# Patient Record
Sex: FEMALE | Race: WHITE | Employment: FULL TIME | URBAN - METROPOLITAN AREA
[De-identification: names, ages, dates, MRNs, and addresses within clinical notes are randomized per-mention and may not be internally consistent; named-entity substitution may affect disease eponyms.]

---

## 2018-04-09 ENCOUNTER — OFFICE VISIT (OUTPATIENT)
Dept: FAMILY MEDICINE CLINIC | Age: 48
End: 2018-04-09
Payer: COMMERCIAL

## 2018-04-09 VITALS
HEART RATE: 60 BPM | HEIGHT: 61 IN | WEIGHT: 162.4 LBS | SYSTOLIC BLOOD PRESSURE: 110 MMHG | DIASTOLIC BLOOD PRESSURE: 79 MMHG | BODY MASS INDEX: 30.66 KG/M2

## 2018-04-09 DIAGNOSIS — Z01.419 ENCOUNTER FOR CERVICAL PAP SMEAR WITH PELVIC EXAM: ICD-10-CM

## 2018-04-09 DIAGNOSIS — M54.6 THORACIC SPINE PAIN: ICD-10-CM
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
## 2018-04-09 DIAGNOSIS — G89.29 CHRONIC RIGHT-SIDED LOW BACK PAIN WITHOUT SCIATICA: Primary | ICD-10-CM

## 2018-04-09 DIAGNOSIS — T14.8XXA MUSCULOSKELETAL STRAIN: ICD-10-CM

## 2018-04-09 DIAGNOSIS — Z83.3 FAMILY HISTORY OF DIABETES MELLITUS: ICD-10-CM

## 2018-04-09 DIAGNOSIS — M54.50 CHRONIC RIGHT-SIDED LOW BACK PAIN WITHOUT SCIATICA: Primary | ICD-10-CM

## 2018-04-09 DIAGNOSIS — R10.9 RIGHT FLANK PAIN: ICD-10-CM

## 2018-04-09 DIAGNOSIS — N95.1 PERIMENOPAUSE: ICD-10-CM

## 2018-04-09 DIAGNOSIS — Z23 NEED FOR PROPHYLACTIC VACCINATION AGAINST DIPHTHERIA-TETANUS-PERTUSSIS (DTP): ICD-10-CM

## 2018-04-09 DIAGNOSIS — Z00.00 PREVENTATIVE HEALTH CARE: ICD-10-CM

## 2018-04-09 DIAGNOSIS — Z13.220 SCREENING FOR HYPERLIPIDEMIA: ICD-10-CM

## 2018-04-09 PROCEDURE — 90471 IMMUNIZATION ADMIN: CPT | Performed by: NURSE PRACTITIONER

## 2018-04-09 PROCEDURE — 99204 OFFICE O/P NEW MOD 45 MIN: CPT | Performed by: NURSE PRACTITIONER

## 2018-04-09 PROCEDURE — 90715 TDAP VACCINE 7 YRS/> IM: CPT | Performed by: NURSE PRACTITIONER

## 2018-04-09 RX ORDER — NAPROXEN 500 MG/1
500 TABLET ORAL 2 TIMES DAILY WITH MEALS
Qty: 60 TABLET | Refills: 1 | Status: SHIPPED | OUTPATIENT
Start: 2018-04-09 | End: 2021-08-18

## 2018-04-09 RX ORDER — TIZANIDINE 4 MG/1
4 TABLET ORAL 3 TIMES DAILY
Qty: 42 TABLET | Refills: 0 | Status: SHIPPED | OUTPATIENT
Start: 2018-04-09 | End: 2021-08-18

## 2018-04-09 ASSESSMENT — PATIENT HEALTH QUESTIONNAIRE - PHQ9
SUM OF ALL RESPONSES TO PHQ9 QUESTIONS 1 & 2: 0
1. LITTLE INTEREST OR PLEASURE IN DOING THINGS: 0
SUM OF ALL RESPONSES TO PHQ QUESTIONS 1-9: 0
2. FEELING DOWN, DEPRESSED OR HOPELESS: 0

## 2018-04-09 ASSESSMENT — ENCOUNTER SYMPTOMS
SHORTNESS OF BREATH: 0
ABDOMINAL PAIN: 0
EYE DISCHARGE: 0
BLOOD IN STOOL: 0
COUGH: 0

## 2020-03-18 ENCOUNTER — HOSPITAL ENCOUNTER (EMERGENCY)
Age: 50
Discharge: HOME | End: 2020-03-19
Attending: EMERGENCY MEDICINE
Payer: COMMERCIAL

## 2020-03-18 PROCEDURE — 93005 ELECTROCARDIOGRAM TRACING: CPT | Performed by: EMERGENCY MEDICINE

## 2020-03-18 PROCEDURE — 99285 EMERGENCY DEPT VISIT HI MDM: CPT

## 2020-03-19 ENCOUNTER — APPOINTMENT (OUTPATIENT)
Dept: GENERAL RADIOLOGY | Age: 50
End: 2020-03-19
Payer: COMMERCIAL

## 2020-03-19 VITALS
HEART RATE: 78 BPM | BODY MASS INDEX: 33.96 KG/M2 | TEMPERATURE: 97.8 F | SYSTOLIC BLOOD PRESSURE: 109 MMHG | DIASTOLIC BLOOD PRESSURE: 70 MMHG | OXYGEN SATURATION: 98 % | WEIGHT: 173 LBS | HEIGHT: 60 IN | RESPIRATION RATE: 17 BRPM

## 2020-03-19 LAB
ABSOLUTE EOS #: 0.1 K/UL (ref 0–0.4)
ABSOLUTE IMMATURE GRANULOCYTE: ABNORMAL K/UL (ref 0–0.3)
ABSOLUTE LYMPH #: 2 K/UL (ref 1–4.8)
ABSOLUTE MONO #: 0.5 K/UL (ref 0.1–1.3)
ANION GAP SERPL CALCULATED.3IONS-SCNC: 12 MMOL/L (ref 9–17)
BASOPHILS # BLD: 1 % (ref 0–2)
BASOPHILS ABSOLUTE: 0.1 K/UL (ref 0–0.2)
BNP INTERPRETATION: NORMAL
BUN BLDV-MCNC: 17 MG/DL (ref 6–20)
BUN/CREAT BLD: ABNORMAL (ref 9–20)
CALCIUM SERPL-MCNC: 9.3 MG/DL (ref 8.6–10.4)
CHLORIDE BLD-SCNC: 104 MMOL/L (ref 98–107)
CO2: 25 MMOL/L (ref 20–31)
CREAT SERPL-MCNC: 0.82 MG/DL (ref 0.5–0.9)
D-DIMER QUANTITATIVE: 0.51 MG/L FEU (ref 0–0.59)
DIFFERENTIAL TYPE: ABNORMAL
EKG ATRIAL RATE: 71 BPM
EKG P AXIS: 30 DEGREES
EKG P-R INTERVAL: 202 MS
EKG Q-T INTERVAL: 378 MS
EKG QRS DURATION: 92 MS
EKG QTC CALCULATION (BAZETT): 410 MS
EKG R AXIS: 61 DEGREES
EKG T AXIS: 24 DEGREES
EKG VENTRICULAR RATE: 71 BPM
EOSINOPHILS RELATIVE PERCENT: 1 % (ref 0–4)
GFR AFRICAN AMERICAN: >60 ML/MIN
GFR NON-AFRICAN AMERICAN: >60 ML/MIN
GFR SERPL CREATININE-BSD FRML MDRD: ABNORMAL ML/MIN/{1.73_M2}
GFR SERPL CREATININE-BSD FRML MDRD: ABNORMAL ML/MIN/{1.73_M2}
GLUCOSE BLD-MCNC: 115 MG/DL (ref 70–99)
HCT VFR BLD CALC: 38 % (ref 36–46)
HEMOGLOBIN: 12.4 G/DL (ref 12–16)
IMMATURE GRANULOCYTES: ABNORMAL %
LYMPHOCYTES # BLD: 39 % (ref 24–44)
MAGNESIUM: 2 MG/DL (ref 1.6–2.6)
MCH RBC QN AUTO: 27.8 PG (ref 26–34)
MCHC RBC AUTO-ENTMCNC: 32.6 G/DL (ref 31–37)
MCV RBC AUTO: 85.4 FL (ref 80–100)
MONOCYTES # BLD: 9 % (ref 1–7)
NRBC AUTOMATED: ABNORMAL PER 100 WBC
PDW BLD-RTO: 12.7 % (ref 11.5–14.9)
PLATELET # BLD: 262 K/UL (ref 150–450)
PLATELET ESTIMATE: ABNORMAL
PMV BLD AUTO: 7.4 FL (ref 6–12)
POTASSIUM SERPL-SCNC: 3.7 MMOL/L (ref 3.7–5.3)
PRO-BNP: 28 PG/ML
RBC # BLD: 4.45 M/UL (ref 4–5.2)
RBC # BLD: ABNORMAL 10*6/UL
SEG NEUTROPHILS: 50 % (ref 36–66)
SEGMENTED NEUTROPHILS ABSOLUTE COUNT: 2.5 K/UL (ref 1.3–9.1)
SODIUM BLD-SCNC: 141 MMOL/L (ref 135–144)
TROPONIN INTERP: NORMAL
TROPONIN INTERP: NORMAL
TROPONIN T: NORMAL NG/ML
TROPONIN T: NORMAL NG/ML
TROPONIN, HIGH SENSITIVITY: <6 NG/L (ref 0–14)
TROPONIN, HIGH SENSITIVITY: <6 NG/L (ref 0–14)
TSH SERPL DL<=0.05 MIU/L-ACNC: 1.81 MIU/L (ref 0.3–5)
WBC # BLD: 5.1 K/UL (ref 3.5–11)
WBC # BLD: ABNORMAL 10*3/UL

## 2020-03-19 PROCEDURE — 2580000003 HC RX 258: Performed by: EMERGENCY MEDICINE

## 2020-03-19 PROCEDURE — 83735 ASSAY OF MAGNESIUM: CPT

## 2020-03-19 PROCEDURE — 96374 THER/PROPH/DIAG INJ IV PUSH: CPT

## 2020-03-19 PROCEDURE — 85379 FIBRIN DEGRADATION QUANT: CPT

## 2020-03-19 PROCEDURE — 85025 COMPLETE CBC W/AUTO DIFF WBC: CPT

## 2020-03-19 PROCEDURE — 80048 BASIC METABOLIC PNL TOTAL CA: CPT

## 2020-03-19 PROCEDURE — 84443 ASSAY THYROID STIM HORMONE: CPT

## 2020-03-19 PROCEDURE — 83880 ASSAY OF NATRIURETIC PEPTIDE: CPT

## 2020-03-19 PROCEDURE — 84484 ASSAY OF TROPONIN QUANT: CPT

## 2020-03-19 PROCEDURE — 6370000000 HC RX 637 (ALT 250 FOR IP): Performed by: EMERGENCY MEDICINE

## 2020-03-19 PROCEDURE — 71046 X-RAY EXAM CHEST 2 VIEWS: CPT

## 2020-03-19 PROCEDURE — 36415 COLL VENOUS BLD VENIPUNCTURE: CPT

## 2020-03-19 PROCEDURE — 2500000003 HC RX 250 WO HCPCS: Performed by: EMERGENCY MEDICINE

## 2020-03-19 RX ORDER — LORAZEPAM 1 MG/1
0.5 TABLET ORAL ONCE
Status: COMPLETED | OUTPATIENT
Start: 2020-03-19 | End: 2020-03-19

## 2020-03-19 RX ORDER — 0.9 % SODIUM CHLORIDE 0.9 %
1000 INTRAVENOUS SOLUTION INTRAVENOUS ONCE
Status: COMPLETED | OUTPATIENT
Start: 2020-03-19 | End: 2020-03-19

## 2020-03-19 RX ORDER — LIDOCAINE HYDROCHLORIDE 20 MG/ML
15 SOLUTION OROPHARYNGEAL ONCE
Status: COMPLETED | OUTPATIENT
Start: 2020-03-19 | End: 2020-03-19

## 2020-03-19 RX ORDER — MAGNESIUM HYDROXIDE/ALUMINUM HYDROXICE/SIMETHICONE 120; 1200; 1200 MG/30ML; MG/30ML; MG/30ML
30 SUSPENSION ORAL ONCE
Status: COMPLETED | OUTPATIENT
Start: 2020-03-19 | End: 2020-03-19

## 2020-03-19 RX ADMIN — LORAZEPAM 0.5 MG: 1 TABLET ORAL at 01:18

## 2020-03-19 RX ADMIN — LIDOCAINE HYDROCHLORIDE 15 ML: 20 SOLUTION ORAL; TOPICAL at 01:18

## 2020-03-19 RX ADMIN — FAMOTIDINE 20 MG: 10 INJECTION INTRAVENOUS at 01:19

## 2020-03-19 RX ADMIN — ALUMINUM HYDROXIDE, MAGNESIUM HYDROXIDE, AND SIMETHICONE 30 ML: 200; 200; 20 SUSPENSION ORAL at 01:19

## 2020-03-19 RX ADMIN — SODIUM CHLORIDE 1000 ML: 9 INJECTION, SOLUTION INTRAVENOUS at 01:29

## 2020-03-19 ASSESSMENT — ENCOUNTER SYMPTOMS
BACK PAIN: 0
VOMITING: 0
SHORTNESS OF BREATH: 1
DIARRHEA: 0
ABDOMINAL PAIN: 0
COUGH: 1

## 2020-03-19 NOTE — ED PROVIDER NOTES
and formal ultrasound images (except ED bedside ultrasound) are read by the radiologist, see reports below, unless otherwisenoted in MDM or here. XR CHEST STANDARD (2 VW)   Final Result   No evidence of acute cardiopulmonary disease           LABS: All lab results were reviewed by myself, and all abnormals are listed below. Labs Reviewed   CBC WITH AUTO DIFFERENTIAL - Abnormal; Notable for the following components:       Result Value    Monocytes 9 (*)     All other components within normal limits   BASIC METABOLIC PANEL - Abnormal; Notable for the following components:    Glucose 115 (*)     All other components within normal limits   D-DIMER, QUANTITATIVE   MAGNESIUM   BRAIN NATRIURETIC PEPTIDE   TROPONIN   TROPONIN   TSH WITH REFLEX       EMERGENCY DEPARTMENTCOURSE:   Differential diagnosis includes ACS pneumonia pneumothorax pulmonary embolism stress reaction. 12:53 AM EDT  Patient's dimer is 0.51 based on her age I do not believe that she has a DVT or PE at this time. There is no elevation in creatinine no electrolyte abnormality BNP is not elevated troponin is negative there is no leukocytosis no anemia TSH is normal.    2:20 AM EDT  Repeat troponin is negative. Chest x-ray is negative for pneumonia or pneumothorax unknown the etiology for the patient's symptoms however she has a low heart score do not believe that she requires inpatient hospitalization for cardiac risk ratification believe that this can be done as an outpatient. Patient feels much better she is completely asymptomatic at this time. She will be discharged. EKG: All EKG's are interpreted by the Emergency Department Physician who either signs or Co-signs this chart in the absence of a cardiologist.  Normal sinus rhythm with a heart rate of 71 beats per minutes prolonged UT interval of 202 ms T wave inversion in lead III no acute ST or T wave changes normal axis no previous to compare    Vitals:    Vitals:    03/18/20 2353 03/18/20

## 2020-03-19 NOTE — ED NOTES
Pt now complains of indigestion at this time. Dr. Bina Kennedy notified.      Cindy Ricci RN  03/19/20 3970

## 2021-08-17 ENCOUNTER — APPOINTMENT (OUTPATIENT)
Dept: CT IMAGING | Age: 51
DRG: 310 | End: 2021-08-17

## 2021-08-17 ENCOUNTER — APPOINTMENT (OUTPATIENT)
Dept: GENERAL RADIOLOGY | Age: 51
DRG: 310 | End: 2021-08-17

## 2021-08-17 ENCOUNTER — HOSPITAL ENCOUNTER (INPATIENT)
Age: 51
LOS: 1 days | Discharge: HOME | DRG: 310 | End: 2021-08-19
Attending: EMERGENCY MEDICINE | Admitting: INTERNAL MEDICINE

## 2021-08-17 DIAGNOSIS — I48.91 ATRIAL FIBRILLATION, UNSPECIFIED TYPE (HCC): Primary | ICD-10-CM

## 2021-08-17 DIAGNOSIS — R42 DIZZINESS: ICD-10-CM

## 2021-08-17 LAB
BILIRUBIN URINE: NEGATIVE
COLOR: YELLOW
COMMENT UA: NORMAL
D-DIMER QUANTITATIVE: 0.56 MG/L FEU (ref 0–0.59)
GLUCOSE URINE: NEGATIVE
KETONES, URINE: NEGATIVE
LACTIC ACID, SEPSIS WHOLE BLOOD: NORMAL MMOL/L (ref 0.5–1.9)
LACTIC ACID, SEPSIS: 0.7 MMOL/L (ref 0.5–1.9)
LEUKOCYTE ESTERASE, URINE: NEGATIVE
NITRITE, URINE: NEGATIVE
PH UA: 7 (ref 5–8)
PROTEIN UA: NEGATIVE
SPECIFIC GRAVITY UA: 1 (ref 1–1.03)
TURBIDITY: CLEAR
URINE HGB: NEGATIVE
UROBILINOGEN, URINE: NORMAL

## 2021-08-17 PROCEDURE — 6360000004 HC RX CONTRAST MEDICATION: Performed by: EMERGENCY MEDICINE

## 2021-08-17 PROCEDURE — 85025 COMPLETE CBC W/AUTO DIFF WBC: CPT

## 2021-08-17 PROCEDURE — 93005 ELECTROCARDIOGRAM TRACING: CPT | Performed by: EMERGENCY MEDICINE

## 2021-08-17 PROCEDURE — 36415 COLL VENOUS BLD VENIPUNCTURE: CPT

## 2021-08-17 PROCEDURE — 84484 ASSAY OF TROPONIN QUANT: CPT

## 2021-08-17 PROCEDURE — 85610 PROTHROMBIN TIME: CPT

## 2021-08-17 PROCEDURE — 81003 URINALYSIS AUTO W/O SCOPE: CPT

## 2021-08-17 PROCEDURE — 82550 ASSAY OF CK (CPK): CPT

## 2021-08-17 PROCEDURE — 2580000003 HC RX 258: Performed by: EMERGENCY MEDICINE

## 2021-08-17 PROCEDURE — 82553 CREATINE MB FRACTION: CPT

## 2021-08-17 PROCEDURE — 80048 BASIC METABOLIC PNL TOTAL CA: CPT

## 2021-08-17 PROCEDURE — 85379 FIBRIN DEGRADATION QUANT: CPT

## 2021-08-17 PROCEDURE — 71260 CT THORAX DX C+: CPT

## 2021-08-17 PROCEDURE — 71045 X-RAY EXAM CHEST 1 VIEW: CPT

## 2021-08-17 PROCEDURE — 85730 THROMBOPLASTIN TIME PARTIAL: CPT

## 2021-08-17 PROCEDURE — 83874 ASSAY OF MYOGLOBIN: CPT

## 2021-08-17 PROCEDURE — 99285 EMERGENCY DEPT VISIT HI MDM: CPT

## 2021-08-17 PROCEDURE — 70450 CT HEAD/BRAIN W/O DYE: CPT

## 2021-08-17 PROCEDURE — 83605 ASSAY OF LACTIC ACID: CPT

## 2021-08-17 RX ORDER — 0.9 % SODIUM CHLORIDE 0.9 %
80 INTRAVENOUS SOLUTION INTRAVENOUS ONCE
Status: COMPLETED | OUTPATIENT
Start: 2021-08-17 | End: 2021-08-17

## 2021-08-17 RX ORDER — 0.9 % SODIUM CHLORIDE 0.9 %
1000 INTRAVENOUS SOLUTION INTRAVENOUS ONCE
Status: COMPLETED | OUTPATIENT
Start: 2021-08-17 | End: 2021-08-17

## 2021-08-17 RX ORDER — 0.9 % SODIUM CHLORIDE 0.9 %
1000 INTRAVENOUS SOLUTION INTRAVENOUS ONCE
Status: COMPLETED | OUTPATIENT
Start: 2021-08-17 | End: 2021-08-18

## 2021-08-17 RX ORDER — SODIUM CHLORIDE 0.9 % (FLUSH) 0.9 %
10 SYRINGE (ML) INJECTION PRN
Status: DISCONTINUED | OUTPATIENT
Start: 2021-08-17 | End: 2021-08-19 | Stop reason: HOSPADM

## 2021-08-17 RX ADMIN — SODIUM CHLORIDE, PRESERVATIVE FREE 10 ML: 5 INJECTION INTRAVENOUS at 21:40

## 2021-08-17 RX ADMIN — SODIUM CHLORIDE 1000 ML: 9 INJECTION, SOLUTION INTRAVENOUS at 20:36

## 2021-08-17 RX ADMIN — IOPAMIDOL 75 ML: 755 INJECTION, SOLUTION INTRAVENOUS at 21:40

## 2021-08-17 RX ADMIN — SODIUM CHLORIDE 80 ML: 9 INJECTION, SOLUTION INTRAVENOUS at 21:40

## 2021-08-17 RX ADMIN — SODIUM CHLORIDE 1000 ML: 9 INJECTION, SOLUTION INTRAVENOUS at 23:55

## 2021-08-17 NOTE — ED TRIAGE NOTES
Mode of arrival (squad #, walk in, police, etc) : [address]        Chief complaint(s): Dizziness, shortness of breath, numbness        Arrival Note (brief scenario, treatment PTA, etc). : Pt arrives to ED c/o shortness of breath and dizziness. Patient states that she works from home and she was sitting when she suddenly got dizzy. Patient reports getting sweaty and reports emesis. EKG completed in triage. C= \"Have you ever felt that you should Cut down on your drinking? \"  No  A= \"Have people Annoyed you by criticizing your drinking? \"  No  G= \"Have you ever felt bad or Guilty about your drinking? \"  No  E= \"Have you ever had a drink as an Eye-opener first thing in the morning to steady your nerves or to help a hangover? \"  No      Deferred []      Reason for deferring: N/A    *If yes to two or more: probable alcohol abuse. *

## 2021-08-18 ENCOUNTER — APPOINTMENT (OUTPATIENT)
Dept: NUCLEAR MEDICINE | Age: 51
DRG: 310 | End: 2021-08-18

## 2021-08-18 ENCOUNTER — APPOINTMENT (OUTPATIENT)
Dept: NON INVASIVE DIAGNOSTICS | Age: 51
DRG: 310 | End: 2021-08-18

## 2021-08-18 ENCOUNTER — APPOINTMENT (OUTPATIENT)
Dept: CT IMAGING | Age: 51
DRG: 310 | End: 2021-08-18

## 2021-08-18 ENCOUNTER — APPOINTMENT (OUTPATIENT)
Dept: MRI IMAGING | Age: 51
DRG: 310 | End: 2021-08-18

## 2021-08-18 PROBLEM — R07.9 CHEST PAIN: Status: ACTIVE | Noted: 2021-08-18

## 2021-08-18 PROBLEM — R42 DIZZINESS: Status: ACTIVE | Noted: 2021-08-18

## 2021-08-18 PROBLEM — E87.6 HYPOKALEMIA: Status: ACTIVE | Noted: 2021-08-18

## 2021-08-18 PROBLEM — I48.91 NEW ONSET ATRIAL FIBRILLATION (HCC): Status: ACTIVE | Noted: 2021-08-18

## 2021-08-18 LAB
% CKMB: 1.3 % (ref 0–3)
ABSOLUTE EOS #: 0 K/UL (ref 0–0.4)
ABSOLUTE IMMATURE GRANULOCYTE: ABNORMAL K/UL (ref 0–0.3)
ABSOLUTE LYMPH #: 1.5 K/UL (ref 1–4.8)
ABSOLUTE MONO #: 0.3 K/UL (ref 0.1–1.3)
ANION GAP SERPL CALCULATED.3IONS-SCNC: 10 MMOL/L (ref 9–17)
ANION GAP SERPL CALCULATED.3IONS-SCNC: 11 MMOL/L (ref 9–17)
BASOPHILS # BLD: 0 % (ref 0–2)
BASOPHILS ABSOLUTE: 0 K/UL (ref 0–0.2)
BUN BLDV-MCNC: 6 MG/DL (ref 6–20)
BUN BLDV-MCNC: 9 MG/DL (ref 6–20)
BUN/CREAT BLD: ABNORMAL (ref 9–20)
BUN/CREAT BLD: ABNORMAL (ref 9–20)
CALCIUM SERPL-MCNC: 8.9 MG/DL (ref 8.6–10.4)
CALCIUM SERPL-MCNC: 9.5 MG/DL (ref 8.6–10.4)
CHLORIDE BLD-SCNC: 100 MMOL/L (ref 98–107)
CHLORIDE BLD-SCNC: 108 MMOL/L (ref 98–107)
CK MB: 1.5 NG/ML
CKMB INTERPRETATION: ABNORMAL
CO2: 24 MMOL/L (ref 20–31)
CO2: 26 MMOL/L (ref 20–31)
CREAT SERPL-MCNC: 0.48 MG/DL (ref 0.5–0.9)
CREAT SERPL-MCNC: 0.64 MG/DL (ref 0.5–0.9)
DIFFERENTIAL TYPE: ABNORMAL
EKG ATRIAL RATE: 129 BPM
EKG ATRIAL RATE: 288 BPM
EKG Q-T INTERVAL: 296 MS
EKG Q-T INTERVAL: 344 MS
EKG QRS DURATION: 76 MS
EKG QRS DURATION: 82 MS
EKG QTC CALCULATION (BAZETT): 398 MS
EKG QTC CALCULATION (BAZETT): 443 MS
EKG R AXIS: 62 DEGREES
EKG R AXIS: 64 DEGREES
EKG T AXIS: 24 DEGREES
EKG T AXIS: 38 DEGREES
EKG VENTRICULAR RATE: 100 BPM
EKG VENTRICULAR RATE: 109 BPM
EOSINOPHILS RELATIVE PERCENT: 1 % (ref 0–4)
GFR AFRICAN AMERICAN: >60 ML/MIN
GFR AFRICAN AMERICAN: >60 ML/MIN
GFR NON-AFRICAN AMERICAN: >60 ML/MIN
GFR NON-AFRICAN AMERICAN: >60 ML/MIN
GFR SERPL CREATININE-BSD FRML MDRD: ABNORMAL ML/MIN/{1.73_M2}
GLUCOSE BLD-MCNC: 94 MG/DL (ref 70–99)
GLUCOSE BLD-MCNC: 95 MG/DL (ref 70–99)
HCG QUALITATIVE: NEGATIVE
HCT VFR BLD CALC: 37.2 % (ref 36–46)
HCT VFR BLD CALC: 39 % (ref 36–46)
HEMOGLOBIN: 12.4 G/DL (ref 12–16)
HEMOGLOBIN: 13 G/DL (ref 12–16)
IMMATURE GRANULOCYTES: ABNORMAL %
INR BLD: 0.9
LV EF: 55 %
LV EF: 58 %
LVEF MODALITY: NORMAL
LVEF MODALITY: NORMAL
LYMPHOCYTES # BLD: 23 % (ref 24–44)
MAGNESIUM: 2.2 MG/DL (ref 1.6–2.6)
MCH RBC QN AUTO: 27.9 PG (ref 26–34)
MCH RBC QN AUTO: 28 PG (ref 26–34)
MCHC RBC AUTO-ENTMCNC: 33.3 G/DL (ref 31–37)
MCHC RBC AUTO-ENTMCNC: 33.4 G/DL (ref 31–37)
MCV RBC AUTO: 83.8 FL (ref 80–100)
MCV RBC AUTO: 83.9 FL (ref 80–100)
MONOCYTES # BLD: 5 % (ref 1–7)
MYOGLOBIN: 21 NG/ML (ref 25–58)
NRBC AUTOMATED: ABNORMAL PER 100 WBC
NRBC AUTOMATED: NORMAL PER 100 WBC
PARTIAL THROMBOPLASTIN TIME: 24.9 SEC (ref 24–36)
PDW BLD-RTO: 13.2 % (ref 11.5–14.9)
PDW BLD-RTO: 13.4 % (ref 11.5–14.9)
PLATELET # BLD: 264 K/UL (ref 150–450)
PLATELET # BLD: 282 K/UL (ref 150–450)
PLATELET ESTIMATE: ABNORMAL
PMV BLD AUTO: 6.8 FL (ref 6–12)
PMV BLD AUTO: 7 FL (ref 6–12)
POTASSIUM SERPL-SCNC: 3.5 MMOL/L (ref 3.7–5.3)
POTASSIUM SERPL-SCNC: 3.9 MMOL/L (ref 3.7–5.3)
PROTHROMBIN TIME: 12.2 SEC (ref 11.8–14.6)
RBC # BLD: 4.44 M/UL (ref 4–5.2)
RBC # BLD: 4.65 M/UL (ref 4–5.2)
RBC # BLD: ABNORMAL 10*6/UL
SEG NEUTROPHILS: 71 % (ref 36–66)
SEGMENTED NEUTROPHILS ABSOLUTE COUNT: 4.7 K/UL (ref 1.3–9.1)
SODIUM BLD-SCNC: 137 MMOL/L (ref 135–144)
SODIUM BLD-SCNC: 142 MMOL/L (ref 135–144)
TOTAL CK: 112 U/L (ref 26–192)
TROPONIN INTERP: ABNORMAL
TROPONIN INTERP: NORMAL
TROPONIN T: ABNORMAL NG/ML
TROPONIN T: NORMAL NG/ML
TROPONIN, HIGH SENSITIVITY: <6 NG/L (ref 0–14)
TROPONIN, HIGH SENSITIVITY: <6 NG/L (ref 0–14)
TSH SERPL DL<=0.05 MIU/L-ACNC: 0.88 MIU/L (ref 0.3–5)
WBC # BLD: 4.8 K/UL (ref 3.5–11)
WBC # BLD: 6.6 K/UL (ref 3.5–11)
WBC # BLD: ABNORMAL 10*3/UL

## 2021-08-18 PROCEDURE — 84443 ASSAY THYROID STIM HORMONE: CPT

## 2021-08-18 PROCEDURE — 6360000002 HC RX W HCPCS: Performed by: EMERGENCY MEDICINE

## 2021-08-18 PROCEDURE — 6360000002 HC RX W HCPCS: Performed by: INTERNAL MEDICINE

## 2021-08-18 PROCEDURE — 85027 COMPLETE CBC AUTOMATED: CPT

## 2021-08-18 PROCEDURE — 99253 IP/OBS CNSLTJ NEW/EST LOW 45: CPT | Performed by: PSYCHIATRY & NEUROLOGY

## 2021-08-18 PROCEDURE — A9500 TC99M SESTAMIBI: HCPCS | Performed by: INTERNAL MEDICINE

## 2021-08-18 PROCEDURE — 70496 CT ANGIOGRAPHY HEAD: CPT

## 2021-08-18 PROCEDURE — 2060000000 HC ICU INTERMEDIATE R&B

## 2021-08-18 PROCEDURE — 2580000003 HC RX 258: Performed by: NURSE PRACTITIONER

## 2021-08-18 PROCEDURE — 70551 MRI BRAIN STEM W/O DYE: CPT

## 2021-08-18 PROCEDURE — 6360000004 HC RX CONTRAST MEDICATION: Performed by: EMERGENCY MEDICINE

## 2021-08-18 PROCEDURE — 84484 ASSAY OF TROPONIN QUANT: CPT

## 2021-08-18 PROCEDURE — 93017 CV STRESS TEST TRACING ONLY: CPT

## 2021-08-18 PROCEDURE — 83735 ASSAY OF MAGNESIUM: CPT

## 2021-08-18 PROCEDURE — 6370000000 HC RX 637 (ALT 250 FOR IP): Performed by: NURSE PRACTITIONER

## 2021-08-18 PROCEDURE — 84703 CHORIONIC GONADOTROPIN ASSAY: CPT

## 2021-08-18 PROCEDURE — 2580000003 HC RX 258: Performed by: EMERGENCY MEDICINE

## 2021-08-18 PROCEDURE — 6360000002 HC RX W HCPCS: Performed by: NURSE PRACTITIONER

## 2021-08-18 PROCEDURE — 36415 COLL VENOUS BLD VENIPUNCTURE: CPT

## 2021-08-18 PROCEDURE — 6370000000 HC RX 637 (ALT 250 FOR IP): Performed by: INTERNAL MEDICINE

## 2021-08-18 PROCEDURE — 93010 ELECTROCARDIOGRAM REPORT: CPT | Performed by: INTERNAL MEDICINE

## 2021-08-18 PROCEDURE — 3430000000 HC RX DIAGNOSTIC RADIOPHARMACEUTICAL: Performed by: INTERNAL MEDICINE

## 2021-08-18 PROCEDURE — 99223 1ST HOSP IP/OBS HIGH 75: CPT | Performed by: INTERNAL MEDICINE

## 2021-08-18 PROCEDURE — 93306 TTE W/DOPPLER COMPLETE: CPT

## 2021-08-18 PROCEDURE — 78452 HT MUSCLE IMAGE SPECT MULT: CPT

## 2021-08-18 PROCEDURE — 2580000003 HC RX 258: Performed by: INTERNAL MEDICINE

## 2021-08-18 PROCEDURE — 80048 BASIC METABOLIC PNL TOTAL CA: CPT

## 2021-08-18 PROCEDURE — 2500000003 HC RX 250 WO HCPCS: Performed by: NURSE PRACTITIONER

## 2021-08-18 PROCEDURE — 93005 ELECTROCARDIOGRAM TRACING: CPT | Performed by: EMERGENCY MEDICINE

## 2021-08-18 RX ORDER — ASPIRIN 81 MG/1
81 TABLET, CHEWABLE ORAL DAILY
Status: DISCONTINUED | OUTPATIENT
Start: 2021-08-19 | End: 2021-08-19 | Stop reason: HOSPADM

## 2021-08-18 RX ORDER — AMINOPHYLLINE DIHYDRATE 25 MG/ML
50 INJECTION, SOLUTION INTRAVENOUS PRN
Status: ACTIVE | OUTPATIENT
Start: 2021-08-18 | End: 2021-08-18

## 2021-08-18 RX ORDER — 0.9 % SODIUM CHLORIDE 0.9 %
80 INTRAVENOUS SOLUTION INTRAVENOUS ONCE
Status: COMPLETED | OUTPATIENT
Start: 2021-08-18 | End: 2021-08-18

## 2021-08-18 RX ORDER — POTASSIUM CHLORIDE 20 MEQ/1
40 TABLET, EXTENDED RELEASE ORAL PRN
Status: DISCONTINUED | OUTPATIENT
Start: 2021-08-18 | End: 2021-08-19 | Stop reason: HOSPADM

## 2021-08-18 RX ORDER — ONDANSETRON 4 MG/1
4 TABLET, ORALLY DISINTEGRATING ORAL EVERY 8 HOURS PRN
Status: DISCONTINUED | OUTPATIENT
Start: 2021-08-18 | End: 2021-08-19 | Stop reason: HOSPADM

## 2021-08-18 RX ORDER — MAGNESIUM SULFATE 1 G/100ML
1000 INJECTION INTRAVENOUS PRN
Status: DISCONTINUED | OUTPATIENT
Start: 2021-08-18 | End: 2021-08-19 | Stop reason: HOSPADM

## 2021-08-18 RX ORDER — ACETAMINOPHEN 650 MG/1
650 SUPPOSITORY RECTAL EVERY 6 HOURS PRN
Status: DISCONTINUED | OUTPATIENT
Start: 2021-08-18 | End: 2021-08-19 | Stop reason: HOSPADM

## 2021-08-18 RX ORDER — MECLIZINE HYDROCHLORIDE 25 MG/1
25 TABLET ORAL 3 TIMES DAILY PRN
Status: DISCONTINUED | OUTPATIENT
Start: 2021-08-18 | End: 2021-08-19 | Stop reason: HOSPADM

## 2021-08-18 RX ORDER — POTASSIUM CHLORIDE 20 MEQ/1
40 TABLET, EXTENDED RELEASE ORAL ONCE
Status: COMPLETED | OUTPATIENT
Start: 2021-08-18 | End: 2021-08-18

## 2021-08-18 RX ORDER — METOPROLOL TARTRATE 5 MG/5ML
5 INJECTION INTRAVENOUS EVERY 5 MIN PRN
Status: ACTIVE | OUTPATIENT
Start: 2021-08-18 | End: 2021-08-18

## 2021-08-18 RX ORDER — POLYETHYLENE GLYCOL 3350 17 G/17G
17 POWDER, FOR SOLUTION ORAL DAILY PRN
Status: DISCONTINUED | OUTPATIENT
Start: 2021-08-18 | End: 2021-08-19 | Stop reason: HOSPADM

## 2021-08-18 RX ORDER — SODIUM CHLORIDE 0.9 % (FLUSH) 0.9 %
5-40 SYRINGE (ML) INJECTION PRN
Status: ACTIVE | OUTPATIENT
Start: 2021-08-18 | End: 2021-08-18

## 2021-08-18 RX ORDER — ATORVASTATIN CALCIUM 40 MG/1
40 TABLET, FILM COATED ORAL NIGHTLY
Status: DISCONTINUED | OUTPATIENT
Start: 2021-08-18 | End: 2021-08-19 | Stop reason: HOSPADM

## 2021-08-18 RX ORDER — POTASSIUM CHLORIDE 7.45 MG/ML
10 INJECTION INTRAVENOUS PRN
Status: DISCONTINUED | OUTPATIENT
Start: 2021-08-18 | End: 2021-08-19 | Stop reason: HOSPADM

## 2021-08-18 RX ORDER — ALBUTEROL SULFATE 90 UG/1
2 AEROSOL, METERED RESPIRATORY (INHALATION) PRN
Status: ACTIVE | OUTPATIENT
Start: 2021-08-18 | End: 2021-08-18

## 2021-08-18 RX ORDER — SODIUM CHLORIDE 0.9 % (FLUSH) 0.9 %
10 SYRINGE (ML) INJECTION PRN
Status: DISCONTINUED | OUTPATIENT
Start: 2021-08-18 | End: 2021-08-19 | Stop reason: HOSPADM

## 2021-08-18 RX ORDER — SODIUM CHLORIDE 9 MG/ML
INJECTION, SOLUTION INTRAVENOUS CONTINUOUS
Status: DISCONTINUED | OUTPATIENT
Start: 2021-08-18 | End: 2021-08-19 | Stop reason: HOSPADM

## 2021-08-18 RX ORDER — ASPIRIN 325 MG
325 TABLET ORAL ONCE
Status: COMPLETED | OUTPATIENT
Start: 2021-08-18 | End: 2021-08-18

## 2021-08-18 RX ORDER — ATROPINE SULFATE 0.1 MG/ML
0.5 INJECTION INTRAVENOUS EVERY 5 MIN PRN
Status: ACTIVE | OUTPATIENT
Start: 2021-08-18 | End: 2021-08-18

## 2021-08-18 RX ORDER — SODIUM CHLORIDE 9 MG/ML
25 INJECTION, SOLUTION INTRAVENOUS PRN
Status: DISCONTINUED | OUTPATIENT
Start: 2021-08-18 | End: 2021-08-19 | Stop reason: HOSPADM

## 2021-08-18 RX ORDER — NITROGLYCERIN 0.4 MG/1
0.4 TABLET SUBLINGUAL EVERY 5 MIN PRN
Status: ACTIVE | OUTPATIENT
Start: 2021-08-18 | End: 2021-08-18

## 2021-08-18 RX ORDER — ONDANSETRON 2 MG/ML
4 INJECTION INTRAMUSCULAR; INTRAVENOUS EVERY 6 HOURS PRN
Status: DISCONTINUED | OUTPATIENT
Start: 2021-08-18 | End: 2021-08-19 | Stop reason: HOSPADM

## 2021-08-18 RX ORDER — SODIUM CHLORIDE 9 MG/ML
500 INJECTION, SOLUTION INTRAVENOUS CONTINUOUS PRN
Status: ACTIVE | OUTPATIENT
Start: 2021-08-18 | End: 2021-08-18

## 2021-08-18 RX ORDER — SODIUM CHLORIDE 0.9 % (FLUSH) 0.9 %
5-40 SYRINGE (ML) INJECTION EVERY 12 HOURS SCHEDULED
Status: DISCONTINUED | OUTPATIENT
Start: 2021-08-18 | End: 2021-08-19 | Stop reason: HOSPADM

## 2021-08-18 RX ORDER — ACETAMINOPHEN 325 MG/1
650 TABLET ORAL EVERY 6 HOURS PRN
Status: DISCONTINUED | OUTPATIENT
Start: 2021-08-18 | End: 2021-08-19 | Stop reason: HOSPADM

## 2021-08-18 RX ADMIN — ENOXAPARIN SODIUM 80 MG: 80 INJECTION SUBCUTANEOUS at 22:17

## 2021-08-18 RX ADMIN — MECLIZINE HYDROCHLORIDE 25 MG: 25 TABLET ORAL at 20:47

## 2021-08-18 RX ADMIN — ASPIRIN 325 MG ORAL TABLET 325 MG: 325 PILL ORAL at 02:18

## 2021-08-18 RX ADMIN — SODIUM CHLORIDE, PRESERVATIVE FREE 10 ML: 5 INJECTION INTRAVENOUS at 00:32

## 2021-08-18 RX ADMIN — FAMOTIDINE 20 MG: 10 INJECTION, SOLUTION INTRAVENOUS at 21:50

## 2021-08-18 RX ADMIN — SODIUM CHLORIDE 80 ML: 9 INJECTION, SOLUTION INTRAVENOUS at 00:32

## 2021-08-18 RX ADMIN — REGADENOSON 0.4 MG: 0.08 INJECTION, SOLUTION INTRAVENOUS at 09:10

## 2021-08-18 RX ADMIN — SODIUM CHLORIDE, PRESERVATIVE FREE 10 ML: 5 INJECTION INTRAVENOUS at 12:58

## 2021-08-18 RX ADMIN — IOPAMIDOL 75 ML: 755 INJECTION, SOLUTION INTRAVENOUS at 00:32

## 2021-08-18 RX ADMIN — TETRAKIS(2-METHOXYISOBUTYLISOCYANIDE)COPPER(I) TETRAFLUOROBORATE 33.6 MILLICURIE: 1 INJECTION, POWDER, LYOPHILIZED, FOR SOLUTION INTRAVENOUS at 12:57

## 2021-08-18 RX ADMIN — POTASSIUM CHLORIDE 20 MEQ: 1500 TABLET, EXTENDED RELEASE ORAL at 02:18

## 2021-08-18 RX ADMIN — AMINOPHYLLINE 100 MG: 25 INJECTION, SOLUTION INTRAVENOUS at 09:13

## 2021-08-18 RX ADMIN — ENOXAPARIN SODIUM 80 MG: 80 INJECTION SUBCUTANEOUS at 12:16

## 2021-08-18 RX ADMIN — METOPROLOL TARTRATE 25 MG: 25 TABLET, FILM COATED ORAL at 12:24

## 2021-08-18 RX ADMIN — SODIUM CHLORIDE: 9 INJECTION, SOLUTION INTRAVENOUS at 05:33

## 2021-08-18 RX ADMIN — ENOXAPARIN SODIUM 80 MG: 100 INJECTION SUBCUTANEOUS at 01:49

## 2021-08-18 RX ADMIN — SODIUM CHLORIDE, PRESERVATIVE FREE 10 ML: 5 INJECTION INTRAVENOUS at 12:17

## 2021-08-18 RX ADMIN — ATORVASTATIN CALCIUM 40 MG: 40 TABLET, FILM COATED ORAL at 20:47

## 2021-08-18 RX ADMIN — METOPROLOL TARTRATE 25 MG: 25 TABLET, FILM COATED ORAL at 20:50

## 2021-08-18 RX ADMIN — SODIUM CHLORIDE, PRESERVATIVE FREE 10 ML: 5 INJECTION INTRAVENOUS at 08:41

## 2021-08-18 RX ADMIN — POTASSIUM CHLORIDE 40 MEQ: 1500 TABLET, EXTENDED RELEASE ORAL at 12:16

## 2021-08-18 RX ADMIN — Medication 10 ML: at 12:18

## 2021-08-18 RX ADMIN — TETRAKIS(2-METHOXYISOBUTYLISOCYANIDE)COPPER(I) TETRAFLUOROBORATE 12.6 MILLICURIE: 1 INJECTION, POWDER, LYOPHILIZED, FOR SOLUTION INTRAVENOUS at 09:11

## 2021-08-18 RX ADMIN — FAMOTIDINE 20 MG: 10 INJECTION, SOLUTION INTRAVENOUS at 12:17

## 2021-08-18 ASSESSMENT — ENCOUNTER SYMPTOMS
COUGH: 0
ALLERGIC/IMMUNOLOGIC NEGATIVE: 1
EYE PAIN: 0
ABDOMINAL PAIN: 0
WHEEZING: 0
SORE THROAT: 0
STRIDOR: 0
CONSTIPATION: 0
NAUSEA: 1
EYE REDNESS: 0
TROUBLE SWALLOWING: 0
SHORTNESS OF BREATH: 1
COLOR CHANGE: 0
SINUS PRESSURE: 0
VOMITING: 1
DIARRHEA: 0
EYE DISCHARGE: 0

## 2021-08-18 ASSESSMENT — PAIN DESCRIPTION - LOCATION: LOCATION: CHEST

## 2021-08-18 ASSESSMENT — PAIN DESCRIPTION - ORIENTATION: ORIENTATION: MID

## 2021-08-18 ASSESSMENT — PAIN DESCRIPTION - PROGRESSION: CLINICAL_PROGRESSION: NOT CHANGED

## 2021-08-18 ASSESSMENT — PAIN SCALES - GENERAL: PAINLEVEL_OUTOF10: 3

## 2021-08-18 ASSESSMENT — PAIN DESCRIPTION - ONSET: ONSET: ON-GOING

## 2021-08-18 ASSESSMENT — VISUAL ACUITY: OU: 1

## 2021-08-18 ASSESSMENT — PAIN - FUNCTIONAL ASSESSMENT: PAIN_FUNCTIONAL_ASSESSMENT: ACTIVITIES ARE NOT PREVENTED

## 2021-08-18 ASSESSMENT — PAIN DESCRIPTION - DESCRIPTORS: DESCRIPTORS: TIGHTNESS

## 2021-08-18 ASSESSMENT — PAIN DESCRIPTION - PAIN TYPE: TYPE: ACUTE PAIN

## 2021-08-18 ASSESSMENT — PAIN DESCRIPTION - FREQUENCY: FREQUENCY: CONTINUOUS

## 2021-08-18 NOTE — CONSULTS
NEUROLOGY CONSULT NOTE      Requesting Physician: Teetee Bailey MD    Reason for Consult:  Evaluate for aneurysm    History of Present Illness:  Francia Fountain is a 46 y.o. female admitted to [facility] on 2021.      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 In the hospital pt was found to have new onset afib. Pt has some feeling of palpitation. At this moment I saw her, she has no complaint. But she does admit if she changes direction fast enough, she will feel a bit vertiginous. Reports no chest pain. No shortness of breath with exertion. Reports no neck pain. No vision changes. No dysphagia. No fever. No rash. No weight loss. Past Medical History:    History reviewed. No pertinent past medical history.         Procedure Laterality Date    BUNIONECTOMY      x 2      SECTION      x 3        Allergies:    No Known Allergies     Current Medications:   sodium chloride flush 0.9 % injection 10 mL, PRN  sodium chloride flush 0.9 % injection 5-40 mL, 2 times per day  sodium chloride flush 0.9 % injection 10 mL, PRN  0.9 % sodium chloride infusion, PRN  potassium chloride (KLOR-CON M) extended release tablet 40 mEq, PRN   Or  potassium bicarb-citric acid (EFFER-K) effervescent tablet 40 mEq, PRN   Or  potassium chloride 10 mEq/100 mL IVPB (Peripheral Line), PRN  magnesium sulfate 1000 mg in dextrose 5% 100 mL IVPB, PRN  ondansetron (ZOFRAN-ODT) disintegrating tablet 4 mg, Q8H PRN   Or  ondansetron (ZOFRAN) injection 4 mg, Q6H PRN  polyethylene glycol (GLYCOLAX) packet 17 g, Daily PRN  acetaminophen (TYLENOL) tablet 650 mg, Q6H PRN   Or  acetaminophen (TYLENOL) suppository 650 mg, Q6H PRN  0.9 % sodium chloride infusion, Continuous  [START ON 2021] aspirin chewable tablet 81 mg, Daily  famotidine (PEPCID) injection 20 mg, BID  enoxaparin (LOVENOX) injection 80 mg, BID  regadenoson (LEXISCAN) injection 0.4 mg, ONCE PRN  sodium chloride flush 0.9 % injection 5-40 mL, PRN  0.9 % sodium chloride infusion, Continuous PRN  albuterol sulfate  (90 Base) MCG/ACT inhaler 2 puff, PRN  atropine injection 0.5 mg, Q5 Min PRN  nitroGLYCERIN (NITROSTAT) SL tablet 0.4 mg, Q5 Min PRN  metoprolol (LOPRESSOR) injection 5 mg, Q5 Min PRN  aminophylline injection 50 mg, PRN  technetium sestamibi (CARDIOLITE) injection 10 millicurie, ONCE PRN  technetium sestamibi (CARDIOLITE) injection 30 millicurie, ONCE PRN  sodium chloride flush 0.9 % injection 10 mL, PRN  sodium chloride flush 0.9 % injection 10 mL, PRN         Social History:  Social History     Tobacco Use   Smoking Status Never Smoker   Smokeless Tobacco Never Used     Social History     Substance and Sexual Activity   Alcohol Use Yes    Comment: rare      Social History     Substance and Sexual Activity   Drug Use Yes    Types: Marijuana    Comment: seldom     Single    Family History:       Problem Relation Age of Onset    Seizures Mother     Diabetes Father     Heart Disease Father         R side HF     Cancer Maternal Aunt         ovarian        Review of Systems:  All systems reviewed and are all negative except what is mentioned in history of present illness. I reviewed the patient PMH,medications, family history, social history. Physical Exam:  BP (!) 155/103   Pulse 80   Temp 97.4 °F (36.3 °C) (Oral)   Resp 16   Ht 5' 1\" (1.549 m)   Wt 163 lb 9.3 oz (74.2 kg)   SpO2 99%   BMI 30.91 kg/m²  I Body mass index is 30.91 kg/m².  I   Wt Readings from Last 1 Encounters:   08/18/21 163 lb 9.3 oz (74.2 kg)           General appearance - alert, in no distress, oriented to person, place, and time and weight  Mental status -Level of Alertness: awake  Orientation: person, place, time  Memory: normal  [employer] of Knowledge: normal  Attention/Concentration: normal  Language: normal. Mood is Anxious  Neck - supple, no neck adenopathy, carotids upstroke normal bilaterally, no carotid bruits. There is no LAP in the neck. There is no thyroid enlargement. Neurological - neg mervin hallpike, neg nystagmus, neg skew  Cranial Dxxtcg-JI-GKD:   Cranial nerve II: Normal. There is full visual fields    Cranial nerve III: Pupils: equal, round, reactive to light   Cranial nerves III, IV, VI: Extraocular Movements: intact   Cranial nerve V: Facial sensation: intact   Cranial nerve VII:Facial strength: intact   Cranial nerve VIII: Hearing: intact  Cranial nerve IX: Palate Elevation intact bilaterally  Cranial nerve XI: Shoulder shrug intact bilaterally  Cranial nerve XII: Tongue midline   neck supple without rigidity  Motor exam is 5/5 in the upper and lower extremities . Normal muscle tone There is no muscle atrophy. There is no muscle fasciculation   Sensory is intact   Coordination: finger to nose intact  Gait and station not tested    Abnormal movement none. Long tracts are intact    Skin - no rashes or lesions, warm and dry to touch  Superficial temporal artery pulses are normal.   Musculoskeletal: Has no hand arthritis, no limitation of ROM in any of the four extremities. no joint tenderness, deformity or swelling. There is no leg edema. The Heart was regular in rate and rhythm. No heart murmur  Chest- Clear  Abdomen: soft, intact bowel sounds.         Labs:    CBC:   Recent Labs     08/17/21  2020 08/18/21  0546   WBC 6.6 4.8   HGB 13.0 12.4    282   MCV 83.9 83.8   MCH 28.0 27.9   MCHC 33.4 33.3   RDW 13.4 13.2     CMP:  Recent Labs     08/17/21  2020 08/18/21  0546    142   K 3.5* 3.9    108*   CO2 26 24   BUN 9 6   CREATININE 0.64 0.48*   GFRAA >60 >60   LABGLOM >60 >60   GLUCOSE 94 95   CALCIUM 9.5 8.9     Liver: No results for input(s): AST, ALT, ALKPHOS, PROT, LABALBU, BILITOT in the last 72 hours.    Invalid input(s): BILDIR  MRI BRAIN:  No results found for this or any previous visit. No results found for this or any previous visit. No results found for this or any previous visit. No results found for this or any previous visit. No results found for this or any previous visit. No results found for this or any previous visit. No results found for this or any previous visit. Results for orders placed during the hospital encounter of 08/17/21    CT Head WO Contrast    Narrative  EXAMINATION:  CT OF THE HEAD WITHOUT CONTRAST  8/17/2021 9:30 pm    TECHNIQUE:  CT of the head was performed without the administration of intravenous  contrast. Dose modulation, iterative reconstruction, and/or weight based  adjustment of the mA/kV was utilized to reduce the radiation dose to as low  as reasonably achievable. COMPARISON:  None. HISTORY:  ORDERING SYSTEM PROVIDED HISTORY: vertigo  TECHNOLOGIST PROVIDED HISTORY:  vertigo    Decision Support Exception - unselect if not a suspected or confirmed  emergency medical condition->Emergency Medical Condition (MA)  Is the patient pregnant?->No  Reason for Exam: Vertigo  Acuity: Acute  Type of Exam: Initial  Additional signs and symptoms: Pt c/o dizziness, shortness of breath for 1  day. FINDINGS:  BRAIN/VENTRICLES: There is no acute intracranial hemorrhage, mass effect or  midline shift. No abnormal extra-axial fluid collection. The gray-white  differentiation is maintained without evidence of an acute infarct. There is  no evidence of hydrocephalus. ORBITS: The visualized portion of the orbits demonstrate no acute abnormality. SINUSES: The visualized paranasal sinuses and mastoid air cells demonstrate  no acute abnormality. SOFT TISSUES/SKULL:  No acute abnormality of the visualized skull or soft  tissues. Impression  No acute intracranial abnormality.         We reviewed the patient records and available information in the EHR R supraclinoid ICA questionable small aneurysm vs infundibulum      Impression:    Principal Problem:    New onset atrial fibrillation (HCC)  Active Problems:    Dizziness    Hypokalemia    Chest pain  Resolved Problems:    * No resolved hospital problems. *    53 year old woman with no major medical hx, c/o 1 week of vertigo, now found to have transient afib. Recommendations:    - this is not BPPV. - will get MRI brain to r/o a stroke  - if MRI brain is negative for stroke, vertigo is likely due to a ear issue  - pt may use meclizine as needed if she feels overly vertiginous, she will need to see ENT to check her ears  - if MRI brain is negative for stroke, pt does not need to be on anticoagulant for stroke prevention, her risk of stroke is low given her young age or risk factors  - pt will need a non urgent diagnostic angigram to look into the R ICA to see if that is an aneurysm, infundibulum or artifact, this can be done in [facility] electively, will make the arrangement for that. - will follow                                            1. Discussed with primary service. 2. Please call if any questions. Time spent was at least 71 min of time evaluating patient, discussion with staff,  planning for treatment and evaluation. The time was spent examining patient, reviewing the images personally, reviewing the chart, perform high complexity decision making and speaking with the nursing staff regarding recommendations.      Electronically signed by Terrell Kapoor MD on 8/18/2021 at 9:03 Theodis Mcburney, MD  Attending Neurologist/Neurointensivist

## 2021-08-18 NOTE — PROCEDURES
[address] [facility]                    [facility]                              CARDIAC STRESS TEST    PATIENT NAME: Julia Patel                     :        1970  MED REC NO:   [id number]                              ROOM:       2087  ACCOUNT NO:   [de-identified]                           ADMIT DATE: 2021  PROVIDER:     Hayley Wong DO    DATE OF STUDY:  2021    TEST TYPE: LEXISCAN CARDIOLYTE STRESS TEST  INDICATION: CHEST PAIN, ATRIAL FIBRILLATION  REFERRING PHYSICIAN: DR. Austin Alonzo 104    RESTING HEART RATE: 70 BEATS PER MINUTE  RESTING BLOOD PRESSURE: 137/87    MEDICATION(S) GIVEN: 0.4 MG IV LEXISCAN  REASON FOR TERMINATION: MEDICATION INFUSION COMPLETE    RESTING EKG: ABNORMAL  COMMENTS: SINUS, 1ST DEGREE AV-BLOCK  STRESS HEART RESPONSE: NORMAL RESPONSE  BLOOD PRESSURE RESPONSE: APPROPRIATE  STRESS EKGs: NORMAL  CHEST DISCOMFORT: NO PAIN  ISCHEMIC EKG CHANGES: NONE    EKG IMPRESSION: ELECTROCARDIOGRAPHICALLY NEGATIVE LEXISCAN STRESS TEST. RADIOISOTOPE RESULTS TO FOLLOW FROM THE DEPARTMENT OF NUCLEAR MEDICINE.             [address], DO    D: 2021 9:59:30       T: 2021 10:00:52     /[id number]  Job#: [id number]     Doc#: Unknown    CC:    (Retain this field even if not dictated or not decipherable)

## 2021-08-18 NOTE — ED PROVIDER NOTES
[address]  EMERGENCY DEPARTMENT ENCOUNTER      Pt Name: Theresa Peterson  MRN: [id number]  [place] 1970  Date of evaluation: 8/17/2021  Provider: Venkat Reddy MD    [address] [place] [address]       Chief Complaint   Patient presents with    Dizziness    Shortness of Breath    Emesis       HISTORY OF PRESENT ILLNESS  (Location/Symptom, Timing/Onset, Context/Setting, Quality, Duration, Modifying Factors, Severity.)   Theresa Peterson is a 46 y.o. female who presents to the emergency department who relates that she was sitting down at her desk working when she suddenly experienced onset of vertigo, shortness of breath and vomiting. She relates she started sweating like crazy and was really concerned that it could be a heart attack or stroke. She relates she had something like this happen about 6 years ago but it did not last nearly as long. She has not experienced anything quite like this before and she relates she is still scared that it could be something serious and that is why she came in. Nothing seems to be making it worse. She is actually feeling better than when it first occurred. Nursing Notes were reviewed. REVIEW OF SYSTEMS    (2-9 systems for level 4, 10 or more for level 5)     Review of Systems   Constitutional: Negative for activity change, appetite change, chills, fatigue and fever. HENT: Negative for congestion, ear pain and sore throat. Eyes: Negative for pain, discharge and redness. Respiratory: Positive for shortness of breath. Negative for cough, wheezing and stridor. Cardiovascular: Negative for chest pain. Gastrointestinal: Positive for nausea and vomiting. Negative for abdominal pain, constipation and diarrhea. Genitourinary: Negative for decreased urine volume and difficulty urinating. Musculoskeletal: Negative for arthralgias and myalgias. Skin: Negative for color change and rash. Neurological: Positive for dizziness.  Negative for weakness and headaches. Psychiatric/Behavioral: Negative for behavioral problems and confusion. Except as noted above the remainder of the review of systems was reviewed and negative. PAST MEDICAL HISTORY   History reviewed. No pertinent past medical history. SURGICAL HISTORY       Past Surgical History:   Procedure Laterality Date    BUNIONECTOMY      x 2      SECTION      x 3        CURRENT MEDICATIONS       Previous Medications    NAPROXEN (NAPROSYN) 500 MG TABLET    Take 1 tablet by mouth 2 times daily (with meals)    TIZANIDINE (ZANAFLEX) 4 MG TABLET    Take 1 tablet by mouth 3 times daily       ALLERGIES     Patient has no known allergies. FAMILY HISTORY           Problem Relation Age of Onset    Seizures Mother     Diabetes Father     Heart Disease Father         R side HF     Cancer Maternal Aunt         ovarian      Family Status   Relation Name Status    Mother  Alive    Father     [facility]      reports that she has never smoked. She has never used smokeless tobacco. She reports current alcohol use. She reports current drug use. Drug: Marijuana. PHYSICAL EXAM    (up to 7 for level 4, 8 or more for level 5)     ED Triage Vitals [21 1924]   BP Temp Temp Source Pulse Resp SpO2 Height Weight   133/82 97.4 °F (36.3 °C) Oral 103 20 100 % 5' 1\" (1.549 m) 185 lb (83.9 kg)     Physical Exam  Vitals and nursing note reviewed. Constitutional:       General: She is not in acute distress. Appearance: She is well-developed. She is not ill-appearing, toxic-appearing or diaphoretic. HENT:      Head: Normocephalic and atraumatic. Right Ear: External ear normal.      Left Ear: External ear normal.      Nose: Nose normal.      Mouth/Throat:      Mouth: Mucous membranes are moist.   Eyes:      General:         Right eye: No discharge. Left eye: No discharge.       Conjunctiva/sclera: Conjunctivae normal.      Pupils: Pupils are equal, round, and reactive to light. Cardiovascular:      Rate and Rhythm: Normal rate and regular rhythm. Heart sounds: Normal heart sounds. No murmur heard. Pulmonary:      Effort: Pulmonary effort is normal. No respiratory distress. Breath sounds: Normal breath sounds. No wheezing or rales. Abdominal:      General: Bowel sounds are normal. There is no distension. Palpations: Abdomen is soft. There is no mass. Tenderness: There is no abdominal tenderness. There is no guarding or rebound. Musculoskeletal:         General: Normal range of motion. Cervical back: Normal range of motion and neck supple. Right lower leg: No edema. Left lower leg: No edema. Skin:     General: Skin is warm. Findings: No rash. Neurological:      General: No focal deficit present. Mental Status: She is alert and oriented to person, place, and time. Motor: No abnormal muscle tone. Psychiatric:         Mood and Affect: Mood is anxious. Behavior: Behavior normal.         DIAGNOSTIC RESULTS     EKG: All EKG's are interpreted by the Emergency Department Physician who either signs or Co-signs this chart in the absence of a cardiologist.    EKG Interpretation    Interpreted by me    Rhythm: atrial fibrillation - rapid  Rate: tachycardia  Axis: normal  Ectopy: none  Conduction: irregularly irregular  ST Segments: nonspecific changes  T Waves: no acute change  Q Waves: nonspecific    Clinical Impression: atrial fibrillation with rapid ventricular rate    RADIOLOGY:   Non-plain film images such as CT, Ultrasound and MRI are read by the radiologist. Plain radiographic images are visualized and preliminarily interpreted by the emergency physician with the below findings:      Interpretation per the Radiologist below, if available at the time of this note:     [facility]   Final Result   1. No large vessel occlusion.   No focal hemodynamically significant stenosis   or occlusion in the large arteries of the head and neck. 2.  Subtle 2 mm outpouching along the inferior aspect of the supraclinoid   right intracranial internal carotid artery could represent a small aneurysm   or infundibulum. CT CHEST PULMONARY EMBOLISM W CONTRAST   Final Result   1. No CT evidence of acute pulmonary embolism. No acute cardiopulmonary   process. 2. 3 mm solid noncalcified pulmonary nodule involving the lingula. Please   see follow-up recommendations below. RECOMMENDATIONS:   3 mm left solid pulmonary nodule. No routine follow-up imaging is recommended. These guidelines do not apply to immunocompromised patients and patients with   cancer. Follow up in patients with significant comorbidities as clinically   warranted. For lung cancer screening, adhere to Lung-RADS guidelines. Reference: Radiology. 2017; 284(1):228-43. CT Head WO Contrast   Final Result   No acute intracranial abnormality. XR CHEST PORTABLE   Final Result   No acute cardiopulmonary disease. ED BEDSIDE ULTRASOUND:   Performed by ED Physician - none    LABS:  Labs Reviewed   STROKE PANEL - Abnormal; Notable for the following components:       Result Value    Potassium 3.5 (*)     Seg Neutrophils 71 (*)     Lymphocytes 23 (*)     Myoglobin 21 (*)     All other components within normal limits   LACTATE, SEPSIS   URINE RT REFLEX TO CULTURE   D-DIMER, QUANTITATIVE   TROPONIN   LACTATE, SEPSIS   TSH WITH REFLEX       All other labs were within normal range or not returned as of this dictation. EMERGENCY DEPARTMENT COURSE and DIFFERENTIAL DIAGNOSIS/MDM:   Vitals:    Vitals:    08/17/21 1924   BP: 133/82   Pulse: 103   Resp: 20   Temp: 97.4 °F (36.3 °C)   TempSrc: Oral   SpO2: 100%   Weight: 185 lb (83.9 kg)   Height: 5' 1\" (1.549 m)     We did discuss lab work and imaging. I discussed CT of the head and CTA as well as EKG and cardiac work-up.   She is comfortable with plan of care and

## 2021-08-18 NOTE — PROGRESS NOTES
Dr. Mechelle voay with patient having diet while waiting stress test results. Diet order put in computer.

## 2021-08-18 NOTE — PROGRESS NOTES
Pt arrived to floor from ED to room 2087. Vitals taken and telemetry applied. No distress noted. See doc flowsheet for details. Call light within reach, and pt educated on its use. Bed in lowest position, and locked. Side rails up x 2. Denied further questions or needs at this time. Will continue to monitor.

## 2021-08-18 NOTE — PLAN OF CARE
Problem: Pain:  Goal: Pain level will decrease  Description: Pain level will decrease  8/18/2021 1631 by Luis Haro RN  Outcome: Ongoing  Note: Patient levar maintain a level of pain that is tolerable , on that is controlled with tylenol      Problem: Pain:  Goal: Control of acute pain  Description: Control of acute pain  Outcome: Ongoing     Problem: Pain:  Goal: Control of chronic pain  Description: Control of chronic pain  Outcome: Ongoing     Problem: Cardiac:  Goal: Hemodynamic stability will improve  Description: Hemodynamic stability will improve  8/18/2021 1631 by Luis Haro RN  Outcome: Ongoing  Note: Patient will be having outpatient testing after she has improved.  She will follow up at Cardiology in 4 weeks after discharge

## 2021-08-18 NOTE — CONSULTS
[facility]  In Patient Cardiology Consult             Date:   2021  Patient name: Max Cano  Date of admission:  2021  7:51 PM  MRN:   [id number]  YOB: 1970    Reason for Admission: Dizziness and SOB. CHIEF COMPLAINT:  Dizziness and SOB. History Obtained From:  Pt and chart    HISTORY OF PRESENT ILLNESS:    This is a 49-year-old female who presented due dizziness, shortness of breath, chest pain. Apparently she has a history of back pain and pyelonephritis. Apparently she also has a history of using marijuana. Apparently she came in with chest pain. She describes it as sharp in nature. She also admits to significant dizziness. She denies any complaints of jaw pain, arm pain, neck pain, orthopnea, PND, lower extremity edema. She was evaluated by the primary team and was set up for a 2D echo as well as a stress test.  She was seen this morning in the stress lab. She continues to have constant chest pain. Past Medical History:  History reviewed. No pertinent past medical history. Past Surgical History:    Past Surgical History:   Procedure Laterality Date    BUNIONECTOMY      x 2      SECTION      x 3          Home Medications:    No outpatient medications have been marked as taking for the 21 encounter [facility] Encounter). Allergies:  Patient has no known allergies.     Social History:    Social History     Socioeconomic History    Marital status: Single     Spouse name: None    Number of children: None    Years of education: None    Highest education level: None   Occupational History    None   Tobacco Use    Smoking status: Never Smoker    Smokeless tobacco: Never Used   Substance and Sexual Activity    Alcohol use: Yes     Comment: rare     Drug use: Yes     Types: Marijuana     Comment: seldom    Sexual activity: Yes   Other Topics Concern    None   Social History Narrative    None     Social Determinants of Health     Financial Resource Strain:     Difficulty of Paying Living Expenses:    Food Insecurity:     Worried About Running Out of Food in the Last Year:     [address] in the Last Year:    Transportation Needs:     Lack of Transportation (Medical):  Lack of Transportation (Non-Medical):    Physical Activity:     Days of Exercise per Week:     Minutes of Exercise per Session:    Stress:     Feeling of Stress :    Social Connections:     Frequency of Communication with Friends and Family:     Frequency of Social Gatherings with Friends and Family:     Attends [employer] Services:     Active Member of Clubs or Organizations:     Attends Club or Organization Meetings:     Marital Status:    Intimate Partner Violence:     Fear of Current or Ex-Partner:     Emotionally Abused:     Physically Abused:     Sexually Abused:         Family History:   Family History   Problem Relation Age of Onset    Seizures Mother     Diabetes Father     Heart Disease Father         R side HF     Cancer Maternal Aunt         ovarian        REVIEW OF SYSTEMS:    · Constitutional: there has been no unanticipated weight loss. There's been No change in energy level, No change in activity level. · Eyes: No visual changes or diplopia. No scleral icterus. · ENT: No Headaches, hearing loss or vertigo. No mouth sores or sore throat. · Cardiovascular: As HPI  · Respiratory: As HPI  · Gastrointestinal: No abdominal pain, appetite loss, blood in stools. No change in bowel or bladder habits. · Genitourinary: No dysuria, trouble voiding, or hematuria. · Musculoskeletal:  No gait disturbance, No weakness or joint complaints. · Integumentary: No rash or pruritis. · Neurological: No headache, diplopia, change in muscle strength, numbness or tingling. No change in gait, balance, coordination, mood, affect, memory, mentation, behavior. · Psychiatric: No anxiety, or depression. · Endocrine: No temperature intolerance. mitral regurgitation. Labs:     CBC:   Recent Labs     08/17/21 2020 08/18/21  0546   WBC 6.6 4.8   HGB 13.0 12.4   HCT 39.0 37.2    282     BMP:   Recent Labs     08/17/21 2020 08/18/21  0546    142   K 3.5* 3.9   CO2 26 24   BUN 9 6   CREATININE 0.64 0.48*   LABGLOM >60 >60   GLUCOSE 94 95     BNP: No results for input(s): BNP in the last 72 hours. PT/INR:   Recent Labs     08/17/21 2020   PROTIME 12.2   INR 0.9     APTT:  Recent Labs     08/17/21 2020   APTT 24.9     CARDIAC ENZYMES:  Recent Labs     08/17/21 2020 08/18/21  0031   TROPHS <6 <6     FASTING LIPID PANEL:No results found for: HDL, LDLDIRECT, LDLCALC, TRIG  LIVER PROFILE:No results for input(s): AST, ALT, LABALBU in the last 72 hours. IMPRESSION:    Patient Active Problem List   Diagnosis    Perimenopause    Right flank pain    Musculoskeletal strain    Family history of diabetes mellitus    Chronic right-sided low back pain without sciatica    Thoracic spine pain    Repetitive use syndrome    New onset atrial fibrillation (HCC)    Dizziness    Hypokalemia    Chest pain     - New onset PAF with RVR- back in NSR  - MSK chest pain- reproducible on exam  - Dizziness- ? Due to Afib   - SOB improved    RECOMMENDATIONS:  - she is s/p multiple fluid boluses  - feels improved  - back in NSR  - agree with 2d echo  - agree with stress test  - continue lovenox full dose  - add lopressor  - if stress and echo are low risk, she will have a low [id number] and therefore will not need eliquis or xarelto. - treat MSK with NSAIDs    Further recs to follow echo and stress    Discussed with patient and nursing. Thank you for allowing me to participate in the care of this patient, please do not hesitate to call if you have any questions. Rosas Alvarez DO, [facility], [address], [address], [place] [address] [facility]  [email or web address]  [phone]

## 2021-08-18 NOTE — H&P
[address] [facility]     HISTORY AND PHYSICAL EXAMINATION            Date:   2021  Patientname:  Subhash Labor  Date of admission:  2021  7:51 PM  MRN:   [id number]  Account:  [de-identified]  YOB: 1970  PCP:    No primary care provider on file. Room:     Code Status:    No Order    CHIEF COMPLAINT     Chief Complaint   Patient presents with    Dizziness    Shortness of Breath    Emesis       HISTORY OF PRESENT ILLNESS  (Character, Onset, Location, Duration,  Exacerbating/RelievingFactors, Radiation,   Associated Symptoms, Severity )      The patient is a 46 y.o.  female, with a history of lower back pain and pyelonephritis. Reports history of using marijuana. Patient presents for evaluation of dizziness, shortness of breath, chest pain that started this evening while she was at home. Describes her dizziness as feeling as if the room is spinning. Dizziness made her nauseated. States she was also diaphoretic. States the symptoms have been intermittent since arrival to the ED. Patient denies visual disturbance, cough, abdominal pain, vomiting, dysuria or diarrhea. Patient states she had unremarkable cardiac stress test about 6 years ago. HPI   1) Location/Symptom nonradiating midsternal chest pain, dizziness, shortness of breath  2) Timing/Onset: Sudden onset this evening while she was sitting at home  3) Context/Setting: Became diaphoretic, felt as if room is spinning  4) Quality: Pressure  5) Duration: Intermittent, symptoms come and go in waves  6) Modifying Factors: No aggravating or alleviating factors  7) Severity: Severe      PAST MEDICAL HISTORY   Patient  has no past medical history on file. PAST SURGICAL HISTORY    Patient  has a past surgical history that includes  section and Bunionectomy.      FAMILY HISTORY    Patient family history includes Cancer in her maternal aunt; Diabetes in her father; Heart Disease in her father; Seizures in her mother. SOCIAL HISTORY    Patient  reports that she has never smoked. She has never used smokeless tobacco. She reports current alcohol use. She reports current drug use. Drug: Marijuana. HOME MEDICATIONS        Prior to Admission medications    Not on File       ALLERGIES      Patient has no known allergies. REVIEW OF SYSTEMS     Review of Systems   Constitutional: Positive for activity change, appetite change, diaphoresis and fatigue. Negative for fever. HENT: Negative for congestion, ear pain, sinus pressure, sore throat and trouble swallowing. Eyes: Negative for visual disturbance. Respiratory: Positive for shortness of breath. Negative for cough and wheezing. Cardiovascular: Positive for chest pain. Negative for leg swelling. Gastrointestinal: Positive for nausea and vomiting. Negative for abdominal pain, constipation and diarrhea. Endocrine: Negative. Genitourinary: Negative for difficulty urinating, dysuria and flank pain. Musculoskeletal: Negative for arthralgias and myalgias. Skin: Negative for color change and pallor. Allergic/Immunologic: Negative. Neurological: Positive for dizziness. Negative for syncope, facial asymmetry, speech difficulty, weakness, light-headedness, numbness and headaches. Hematological: Negative. Psychiatric/Behavioral: Negative for behavioral problems, confusion and decreased concentration. The patient is nervous/anxious. PHYSICAL EXAM      /82   Pulse 103   Temp 97.4 °F (36.3 °C) (Oral)   Resp 20   Ht 5' 1\" (1.549 m)   Wt 185 lb (83.9 kg)   SpO2 100%   BMI 34.96 kg/m²  Body mass index is 34.96 kg/m². Physical Exam  Constitutional:       Appearance: Normal appearance. She is well-developed, well-groomed and overweight. She is ill-appearing. HENT:      Head: Normocephalic.       Right Ear: External ear normal.      Left Ear: External ear normal.      Nose: Nose normal.      Mouth/Throat:      Mouth: Mucous membranes are dry. Eyes:      General: Lids are normal. Vision grossly intact. Extraocular Movements: Extraocular movements intact. Conjunctiva/sclera: Conjunctivae normal.      Pupils: Pupils are equal, round, and reactive to light. Cardiovascular:      Rate and Rhythm: Tachycardia present. Rhythm irregular. Pulses: Normal pulses. Heart sounds: Normal heart sounds. Pulmonary:      Effort: Pulmonary effort is normal. Tachypnea present. No respiratory distress. Breath sounds: Normal breath sounds. No decreased breath sounds, wheezing, rhonchi or rales. Abdominal:      General: Bowel sounds are normal. There is no distension. Palpations: Abdomen is soft. Tenderness: There is no abdominal tenderness. Musculoskeletal:         General: Normal range of motion. Cervical back: Normal range of motion and neck supple. Skin:     General: Skin is warm and dry. Capillary Refill: Capillary refill takes less than 2 seconds. Neurological:      Mental Status: She is alert and oriented to person, place, and time. GCS: GCS eye subscore is 4. GCS verbal subscore is 5. GCS motor subscore is 6. Cranial Nerves: Cranial nerves are intact. Sensory: Sensation is intact. Motor: Motor function is intact. Psychiatric:         Attention and Perception: Attention normal.         Mood and Affect: Mood is anxious. Speech: Speech normal.         Behavior: Behavior normal.         Thought Content:  Thought content normal.         Cognition and Memory: Cognition normal.         Judgment: Judgment normal.       DIAGNOSTICS      EKG: (as documented in ED note):  Rhythm: atrial fibrillation - rapid  Rate: tachycardia  Axis: normal  Ectopy: none  Conduction: irregularly irregular  ST Segments: nonspecific changes  T Waves: no acute change  Q Waves: nonspecific     Clinical Impression: atrial fibrillation with rapid ventricular rate    Labs:  CBC:   Recent Labs 08/17/21 2020   WBC 6.6   HGB 13.0        BMP:    Recent Labs     08/17/21 2020      K 3.5*      CO2 26   BUN 9   CREATININE 0.64   GLUCOSE 94     S. Calcium:  Recent Labs     08/17/21 2020   CALCIUM 9.5     S. Ionized Calcium:No results for input(s): IONCA in the last 72 hours. S. Magnesium:No results for input(s): MG in the last 72 hours. S. Phosphorus:No results for input(s): PHOS in the last 72 hours. S. Glucose:No results for input(s): POCGLU in the last 72 hours. Glycosylated hemoglobin A1C: No results found for: LABA1C  Hepatic: No results for input(s): AST, ALT, ALB, ALKPHOS in the last 72 hours. Invalid input(s):  PROT,  BILITOT,  BILIDIR  CARDIAC ENZY:   Recent Labs     08/17/21 2020 08/18/21  0031   CKTOTAL 112  --    CKMB 1.5  --    TROPHS <6 <6   MYOGLOBIN 21*  --      INR:   Recent Labs     08/17/21 2020   INR 0.9     BNP: No results for input(s): PROBNP in the last 72 hours. ABGs: No results for input(s): PH, PCO2, PO2, HCO3, O2SAT in the last 72 hours. Lipids: No results for input(s): CHOL, TRIG, HDL, LDLCALC in the last 72 hours. Invalid input(s): LDL  Pancreatic functions:No results for input(s): LIPASE, AMYLASE in the last 72 hours. Radha Crest: No results for input(s): LACTA in the last 72 hours. Thyroid functions:   Lab Results   Component Value Date    TSH 1.81 03/19/2020      U/A:  Recent Labs     08/17/21  2215   COLORU YELLOW   SPECGRAV 1.005   LEUKOCYTESUR NEGATIVE   GLUCOSEU NEGATIVE       Imaging/Diagonstics:     CT Head WO Contrast    Result Date: 8/17/2021  EXAMINATION: CT OF THE HEAD WITHOUT CONTRAST  8/17/2021 9:30 pm TECHNIQUE: CT of the head was performed without the administration of intravenous contrast. Dose modulation, iterative reconstruction, and/or weight based adjustment of the mA/kV was utilized to reduce the radiation dose to as low as reasonably achievable. COMPARISON: None.  HISTORY: ORDERING SYSTEM PROVIDED HISTORY: vertigo TECHNOLOGIST PROVIDED HISTORY: vertigo Decision Support Exception - unselect if not a suspected or confirmed emergency medical condition->Emergency Medical Condition (MA) Is the patient pregnant?->No Reason for Exam: Vertigo Acuity: Acute Type of Exam: Initial Additional signs and symptoms: Pt c/o dizziness, shortness of breath for 1 day. FINDINGS: BRAIN/VENTRICLES: There is no acute intracranial hemorrhage, mass effect or midline shift. No abnormal extra-axial fluid collection. The gray-white differentiation is maintained without evidence of an acute infarct. There is no evidence of hydrocephalus. ORBITS: The visualized portion of the orbits demonstrate no acute abnormality. SINUSES: The visualized paranasal sinuses and mastoid air cells demonstrate no acute abnormality. SOFT TISSUES/SKULL:  No acute abnormality of the visualized skull or soft tissues. No acute intracranial abnormality. XR CHEST PORTABLE    Result Date: 8/17/2021  EXAMINATION: ONE XRAY VIEW OF THE CHEST 8/17/2021 8:12 pm COMPARISON: 03/19/2020 HISTORY: ORDERING SYSTEM PROVIDED HISTORY: dizzy TECHNOLOGIST PROVIDED HISTORY: dizzy Reason for Exam: PT CO cough with SOB and dizziness X 1 day. Acuity: Acute Type of Exam: Initial FINDINGS: Portable study was obtained at low lung volumes with crowding of lung markings at the bases. No acute confluent airspace disease. No pneumothorax or pleural fluid. Azygous fissure again noted. No acute bone finding. No acute cardiopulmonary disease. CT CHEST PULMONARY EMBOLISM W CONTRAST    Result Date: 8/17/2021  EXAMINATION: CTA OF THE CHEST 8/17/2021 9:31 pm TECHNIQUE: CTA of the chest was performed after the administration of intravenous contrast.  Multiplanar reformatted images are provided for review. MIP images are provided for review.  Dose modulation, iterative reconstruction, and/or weight based adjustment of the mA/kV was utilized to reduce the radiation dose to as low as reasonably achievable. COMPARISON: None. HISTORY: ORDERING SYSTEM PROVIDED HISTORY: elevated ddimer TECHNOLOGIST PROVIDED HISTORY: elevated ddimer Decision Support Exception - unselect if not a suspected or confirmed emergency medical condition->Emergency Medical Condition (MA) Reason for Exam: Elevated d-dimer Acuity: Acute Type of Exam: Initial Additional signs and symptoms: Pt c/o dizziness, shortness of breath for 1 day. FINDINGS: Pulmonary Arteries: Pulmonary arteries are adequately opacified for evaluation. No evidence of intraluminal filling defect to suggest pulmonary embolism. Main pulmonary artery is normal in caliber. Mediastinum: Thoracic aorta demonstrates no evidence of aneurysm or dissection. No pericardial effusion. No lymphadenopathy. The esophagus is grossly unremarkable. Lungs/pleura: Azygous lobe is present. Dependent atelectatic changes. No focal consolidation, pneumothorax or pleural effusion. Calcified granuloma right upper lobe. 3 mm solid noncalcified pulmonary nodule involving the lingula series 4, image 69. Mild lung scarring. Upper Abdomen: No acute findings. Ill-defined arterial enhancement is seen involving the right lobe of the liver likely perfusional in nature. Soft Tissues/Bones: Visualized soft tissues surrounding the chest wall demonstrate no acute findings. Osseous structures demonstrate degenerative change. 1. No CT evidence of acute pulmonary embolism. No acute cardiopulmonary process. 2. 3 mm solid noncalcified pulmonary nodule involving the lingula. Please see follow-up recommendations below. RECOMMENDATIONS: 3 mm left solid pulmonary nodule. No routine follow-up imaging is recommended. These guidelines do not apply to immunocompromised patients and patients with cancer. Follow up in patients with significant comorbidities as clinically warranted. For lung cancer screening, adhere to Lung-RADS guidelines. Reference: Radiology. 2017; 284(1):228-43.      CTA HEAD NECK W CONTRAST    Result Date: 8/18/2021  EXAMINATION: CTA OF THE HEAD AND NECK WITH CONTRAST 8/18/2021 12:06 am: TECHNIQUE: CTA of the head and neck was performed with the administration of intravenous contrast. Multiplanar reformatted images are provided for review. MIP images are provided for review. Stenosis of the internal carotid arteries measured using NASCET criteria. Dose modulation, iterative reconstruction, and/or weight based adjustment of the mA/kV was utilized to reduce the radiation dose to as low as reasonably achievable. COMPARISON: CT head done 08/17/2021. HISTORY: ORDERING SYSTEM PROVIDED HISTORY: vertigo TECHNOLOGIST PROVIDED HISTORY: vertigo Decision Support Exception - unselect if not a suspected or confirmed emergency medical condition->Emergency Medical Condition (MA) Reason for Exam: Vertigo Acuity: Acute Type of Exam: Initial Additional signs and symptoms: Pt c/o dizziness and shortness of breath for 1 day. FINDINGS: CTA NECK: AORTIC ARCH/ARCH VESSELS: No dissection or arterial injury. No significant stenosis of the brachiocephalic or subclavian arteries. CAROTID ARTERIES: No dissection, arterial injury, or hemodynamically significant stenosis by NASCET criteria. VERTEBRAL ARTERIES: No dissection, arterial injury, or significant stenosis. SOFT TISSUES: The lung apices are clear. No cervical or superior mediastinal lymphadenopathy. The larynx and pharynx are unremarkable. No acute abnormality of the salivary and thyroid glands. BONES: No acute osseous abnormality. CTA HEAD: ANTERIOR CIRCULATION: No significant stenosis of the intracranial internal carotid, anterior cerebral, or middle cerebral arteries. 2 mm outpouching along the inferior aspect of the supraclinoid right intracranial internal carotid artery could represent a small aneurysm or infundibulum (series 5, image 33). POSTERIOR CIRCULATION: No significant stenosis of the vertebral, basilar, or posterior cerebral arteries.  No aneurysm. OTHER: No dural venous sinus thrombosis on this non-dedicated study. BRAIN: No mass effect or midline shift. No extra-axial fluid collection. The gray-white differentiation is maintained. 1.  No large vessel occlusion. No focal hemodynamically significant stenosis or occlusion in the large arteries of the head and neck. 2.  Subtle 2 mm outpouching along the inferior aspect of the supraclinoid right intracranial internal carotid artery could represent a small aneurysm or infundibulum. ASSESSMENT  and  PLAN     Principal Problem:    New onset atrial fibrillation (HCC)  Active Problems:    Dizziness    Hypokalemia    Chest pain  Resolved Problems:    * No resolved hospital problems. *    Plan:    Chest pain/new onset atrial fibrillation  -Initial EKG per ER physician showed atrial fibrillation with RVR with a rate of 109  -X-ray showed no acute cardiopulmonary disease  -Troponin <6, <6  -Patient given 2 L normal saline bolus, Lovenox 1 mg/kg, and aspirin 325 mg in the ED  -Blood pressure 133/82  -Aspirin 81 mg daily  -2D echo in the morning  -Consult cardiology    Dizziness  -CT head without contrast shows no acute intracranial abnormality. -CTA head neck shows no large vessel occlusion. Subtle 2 mm outpouching along the inferior aspect of the supraclinoid right intracranial internal carotid artery could represent a small aneurysm or infundibulum.   -Consult neurology  -MRI brain in the morning  -[email or web address]    Hypokalemia  -Initial K 3.5. Patient received potassium 40 mEq supplement in the ED  -Potassium sliding scale  -Daily BMP    GI prophylaxis-Pepcid 20 mg IV twice daily  DVT prophylaxis-on Lovenox 1 mg/kg for new onset A. fib    Consultations:     IP CONSULT TO INTERNAL MEDICINE  IP CONSULT TO CARDIOLOGY  IP CONSULT TO NEUROLOGY      TISHA Hurt - CNP   8/18/2021  2:51 AM    [address]  [place] [address]    Phone [phone] Attending Physician Statement  I have discussed the care of Yessenia Katz and I have examined the patient myselft and taken ros and hpi , including pertinent history and exam findings,  with the resident. I have reviewed the key elements of all parts of the encounter with the resident. I agree with the assessment, plan and orders as documented by the resident.   64-year-old lady class I obesity BMI 30.91 baseline 63 pounds history of mild intermittent asthma no smoking no COPD no alcohol abuse admitted with 9 to 10 days history of palpitation with some chest pressure also started noticing some spinning sensations and one near syncope episode emergency room evaluation showed patient was in atrial fibrillation with rapid ventricular response heart rate was 109  CTA done no PE TSH was checked 0.8  Atrial fibrillation with unclear etiology at this time possible obesity may need a sleep study outpatient  Stress test to rule out ischemic event or angina  CTA head neck no stroke possible small aneurysm further work-up with MRI to rule out cerebellar stroke clinically low suspicion  Patient evaluated examined and case discussed with the neuro critical care attending at bedside  Per cardiology patient has a low risk for stroke from her A. fib due to her age and lone A. fib no anticoagulants    Electronically signed by Michael Turner MD

## 2021-08-18 NOTE — FLOWSHEET NOTE
08/18/21 1148   Encounter Summary   Services provided to: Patient and family together   [facility]   (8-18-21[facility]   Complexity of Encounter Low   Length of Encounter 15 minutes   Routine   Type Initial   Assessment Calm; Approachable   Intervention Active listening;Explored feelings, thoughts, concerns;Sustaining presence/ Ministry of presence;[place]; Discussed illness/injury and it's impact   Outcome Expressed gratitude;Engaged in conversation;Expressed feelings/needs/concerns;Coping;Receptive

## 2021-08-18 NOTE — PROGRESS NOTES
CST Lexiscan. Stress Tech performs patient preparation of physical comfort, review test procedures, pre-stress EKG. Lung Sounds clear t/o. Consent verified by pt. .  Educated patient on test procedure and possible side effects of Lexiscan as well as s/s to report. Cardiologist reviewed pre-test EKG and is present for test.  Patient tolerated test well with SOB and nausea. Administered 100 mg aminophylline per Dr. Aida Sheets, pt. Returned to baseline. Start /87 HR 70  Stop /96 HR 78  EKG portion of testing is completed and negative, nuc. med. portion is still pending.

## 2021-08-18 NOTE — PLAN OF CARE
Problem: Pain:  Goal: Pain level will decrease  Description: Pain level will decrease  Outcome: Ongoing  Note: Pt reports chest pain but refuses any pain medications. Non-pharmacological interventions offered.       Problem: Cardiac:  Goal: Hemodynamic stability will improve  Description: Hemodynamic stability will improve  Outcome: Ongoing  Note:   Vitals:    08/18/21 0508   BP: (!) 155/103   Pulse: 80   Resp: 16   Temp: 97.4 °F (36.3 °C)   SpO2: 99%

## 2021-08-18 NOTE — CARE COORDINATION
CASE MANAGEMENT NOTE:    Admission Date:  8/17/2021 Yamilet Felix is a 46 y.o.  female    Admitted for : Dizziness [R42]  New onset atrial fibrillation ([facility] [address] [I48.91]  Atrial fibrillation, unspecified type ([facility] [address] [I48.91]    Met with:  Patient    PCP:  Has no PCP                                Insurance:  Prashant christensen      Is patient alert and oriented at time of discussion:  Yes    Current Residence/ Living Arrangements:  independently at home             Current Services PTA:  No    Does patient go to outpatient dialysis: No  If yes, location and chair time:     Is patient agreeable to VNS: No    Freedom of choice provided:  Yes    List of [address] provided: Yes    VNS chosen:  No    DME:  none    Home Oxygen: No    Nebulizer: No    CPAP/BIPAP: No    Supplier: N/A    Potential Assistance Needed: yes, needs new PCP , Dr Irwin Duran or Dr Jenny Caro agreed,     SNF needed: No    Freedom of choice and list provided: NA    Pharmacy:  [facility]. Does Patient want to use MEDS to BEDS? Yes    Is patient currently receiving oral anticoagulation therapy? No    Is the Patient an [facility] with Readmission Risk Score greater than 14%? No  If yes, pt needs a follow up appointment made within 7 days. Family Members/Caregivers that pt would like involved in their care:    Yes    If yes, list name here:  [address]    Transportation Provider:  Friend          Discharge Plan:  8/18/21 - self pay -  Patient from home with SO. Has no DME;s, Denies need for VNS, needs a PCP, Dr Irwin Duran office agreed to accept, Has f/u appt and new PCP appt with Dr Dante Hanna 9/8/21 at 9:30a.m.,  Plan is to return home with no needs. Will follow . //pf                  Electronically signed by: Rosalio Barnes RN on 8/18/2021 at 4:31 PM

## 2021-08-19 VITALS
WEIGHT: 166.45 LBS | HEIGHT: 61 IN | RESPIRATION RATE: 14 BRPM | SYSTOLIC BLOOD PRESSURE: 137 MMHG | HEART RATE: 59 BPM | TEMPERATURE: 97.5 F | BODY MASS INDEX: 31.43 KG/M2 | OXYGEN SATURATION: 97 % | DIASTOLIC BLOOD PRESSURE: 88 MMHG

## 2021-08-19 LAB
ANION GAP SERPL CALCULATED.3IONS-SCNC: 11 MMOL/L (ref 9–17)
BUN BLDV-MCNC: 8 MG/DL (ref 6–20)
BUN/CREAT BLD: NORMAL (ref 9–20)
CALCIUM SERPL-MCNC: 9.6 MG/DL (ref 8.6–10.4)
CHLORIDE BLD-SCNC: 105 MMOL/L (ref 98–107)
CHOLESTEROL/HDL RATIO: 2.6
CHOLESTEROL: 186 MG/DL
CO2: 24 MMOL/L (ref 20–31)
CREAT SERPL-MCNC: 0.61 MG/DL (ref 0.5–0.9)
GFR AFRICAN AMERICAN: >60 ML/MIN
GFR NON-AFRICAN AMERICAN: >60 ML/MIN
GFR SERPL CREATININE-BSD FRML MDRD: NORMAL ML/MIN/{1.73_M2}
GFR SERPL CREATININE-BSD FRML MDRD: NORMAL ML/MIN/{1.73_M2}
GLUCOSE BLD-MCNC: 87 MG/DL (ref 70–99)
HCT VFR BLD CALC: 39.4 % (ref 36–46)
HDLC SERPL-MCNC: 72 MG/DL
HEMOGLOBIN: 12.8 G/DL (ref 12–16)
LDL CHOLESTEROL: 98 MG/DL (ref 0–130)
MAGNESIUM: 2.2 MG/DL (ref 1.6–2.6)
MCH RBC QN AUTO: 28 PG (ref 26–34)
MCHC RBC AUTO-ENTMCNC: 32.5 G/DL (ref 31–37)
MCV RBC AUTO: 86 FL (ref 80–100)
NRBC AUTOMATED: NORMAL PER 100 WBC
PDW BLD-RTO: 13.3 % (ref 11.5–14.9)
PLATELET # BLD: 238 K/UL (ref 150–450)
PMV BLD AUTO: 6.8 FL (ref 6–12)
POTASSIUM SERPL-SCNC: 4.1 MMOL/L (ref 3.7–5.3)
RBC # BLD: 4.58 M/UL (ref 4–5.2)
SODIUM BLD-SCNC: 140 MMOL/L (ref 135–144)
TRIGL SERPL-MCNC: 82 MG/DL
VLDLC SERPL CALC-MCNC: NORMAL MG/DL (ref 1–30)
WBC # BLD: 4.3 K/UL (ref 3.5–11)

## 2021-08-19 PROCEDURE — 80048 BASIC METABOLIC PNL TOTAL CA: CPT

## 2021-08-19 PROCEDURE — 6360000002 HC RX W HCPCS: Performed by: NURSE PRACTITIONER

## 2021-08-19 PROCEDURE — 6370000000 HC RX 637 (ALT 250 FOR IP): Performed by: NURSE PRACTITIONER

## 2021-08-19 PROCEDURE — 36415 COLL VENOUS BLD VENIPUNCTURE: CPT

## 2021-08-19 PROCEDURE — 80061 LIPID PANEL: CPT

## 2021-08-19 PROCEDURE — 2580000003 HC RX 258: Performed by: NURSE PRACTITIONER

## 2021-08-19 PROCEDURE — 2500000003 HC RX 250 WO HCPCS: Performed by: NURSE PRACTITIONER

## 2021-08-19 PROCEDURE — 99232 SBSQ HOSP IP/OBS MODERATE 35: CPT | Performed by: PSYCHIATRY & NEUROLOGY

## 2021-08-19 PROCEDURE — 85027 COMPLETE CBC AUTOMATED: CPT

## 2021-08-19 PROCEDURE — 99239 HOSP IP/OBS DSCHRG MGMT >30: CPT | Performed by: INTERNAL MEDICINE

## 2021-08-19 PROCEDURE — 83735 ASSAY OF MAGNESIUM: CPT

## 2021-08-19 PROCEDURE — 2580000003 HC RX 258: Performed by: INTERNAL MEDICINE

## 2021-08-19 RX ORDER — ATORVASTATIN CALCIUM 40 MG/1
40 TABLET, FILM COATED ORAL NIGHTLY
Qty: 30 TABLET | Refills: 3 | Status: SHIPPED | OUTPATIENT
Start: 2021-08-19

## 2021-08-19 RX ORDER — MECLIZINE HYDROCHLORIDE 25 MG/1
25 TABLET ORAL 3 TIMES DAILY PRN
Qty: 30 TABLET | Refills: 0 | Status: SHIPPED | OUTPATIENT
Start: 2021-08-19 | End: 2021-08-29

## 2021-08-19 RX ADMIN — METOPROLOL TARTRATE 12.5 MG: 25 TABLET, FILM COATED ORAL at 09:48

## 2021-08-19 RX ADMIN — ENOXAPARIN SODIUM 80 MG: 80 INJECTION SUBCUTANEOUS at 10:12

## 2021-08-19 RX ADMIN — ASPIRIN 81 MG: 81 TABLET, CHEWABLE ORAL at 09:49

## 2021-08-19 RX ADMIN — FAMOTIDINE 20 MG: 10 INJECTION, SOLUTION INTRAVENOUS at 09:49

## 2021-08-19 RX ADMIN — SODIUM CHLORIDE, PRESERVATIVE FREE 10 ML: 5 INJECTION INTRAVENOUS at 10:12

## 2021-08-19 RX ADMIN — Medication 10 ML: at 10:14

## 2021-08-19 NOTE — PROGRESS NOTES
[facility]  Progress Note                   Date:   8/19/2021  Patient name: Ravinder Bethea  Date of admission:  8/17/2021  7:51 PM  MRN:   [id number]  YOB: 1970  PCP: No primary care provider on file. Reason for Admission: Dizziness [R42]  New onset atrial fibrillation ([address] [I48.91]  Atrial fibrillation, unspecified type ([address] [I48.91]    Subjective:       Clinical Changes /Abnormalities: Seen & examined in room. No acute CV issues/concerns overnight. Labs, vitals, & tele reviewed. Remains SB/SR. Review of Systems    Medications:   Scheduled Meds:   sodium chloride flush  5-40 mL Intravenous 2 times per day    aspirin  81 mg Oral Daily    famotidine (PEPCID) injection  20 mg Intravenous BID    enoxaparin  1 mg/kg Subcutaneous BID    metoprolol tartrate  25 mg Oral BID    atorvastatin  40 mg Oral Nightly     Continuous Infusions:   sodium chloride      sodium chloride Stopped (08/18/21 1023)     CBC:   Recent Labs     08/17/21 2020 08/18/21 0546 08/19/21  0513   WBC 6.6 4.8 4.3   HGB 13.0 12.4 12.8    282 238     BMP:    Recent Labs     08/17/21 2020 08/18/21 0546 08/19/21  0513    142 140   K 3.5* 3.9 4.1    108* 105   CO2 26 24 24   BUN 9 6 8   CREATININE 0.64 0.48* 0.61   GLUCOSE 94 95 87     Hepatic:No results for input(s): AST, ALT, ALB, BILITOT, ALKPHOS in the last 72 hours. Troponin:   Recent Labs     08/17/21 2020 08/18/21  0031   TROPHS <6 <6     BNP: No results for input(s): BNP in the last 72 hours. Lipids:   Recent Labs     08/19/21  0513   CHOL 186   HDL 72     INR:   Recent Labs     08/17/21 2020   INR 0.9       Objective:   Vitals: /88   Pulse 59   Temp 97.5 °F (36.4 °C) (Oral)   Resp 14   Ht 5' 1\" (1.549 m)   Wt 166 lb 7.2 oz (75.5 kg)   SpO2 97%   BMI 31.45 kg/m²   General appearance: alert and cooperative with exam  HEENT: Head: Normocephalic, no lesions, without obvious abnormality.   Neck:no JVD, trachea midline, no adenopathy  Lungs: Clear to auscultation  Heart: Regular rate and rhythm, s1/s2 auscultated, no murmurs. SB  Abdomen: soft, non-tender, bowel sounds active  Extremities: no edema  Neurologic: not done    Echo 8/17/21  Summary  Normal left ventricle size, wall thickness and function with an estimated EF  55%. Mild mitral regurgitation. Stress Test 8/18/21  Impression       No stress-induced ischemia.       No infarct.       Normal LVEF.       Risk stratification: Low         Assessment / Acute Cardiac Problems:   - New onset PAF with RVR- back in NSR  - MSK chest pain- reproducible on exam  - Dizziness- ? Due to Afib   - SOB improved    Patient Active Problem List:     Perimenopause     Right flank pain     Musculoskeletal strain     Family history of diabetes mellitus     Chronic right-sided low back pain without sciatica     Thoracic spine pain     Repetitive use syndrome     New onset atrial fibrillation (HCC)     Dizziness     Hypokalemia     Chest pain    [id number] Score for Atrial Fibrillation Stroke Risk   Risk   Factors  Component Value   C CHF No 0   H HTN No 0   A2 Age >= 75 No,  (46 y.o.) 0   D DM No 0   S2 Prior Stroke/TIA No 0   V Vascular Disease No 0   A Age 74-69 No,  (46 y.o.) 0   Sc Sex female 1    [id number]  Score  1   Score last updated 8/19/21 2:90 AM EDT    Click here for a link to the UpToDate guideline \"Atrial Fibrillation: Anticoagulation therapy to prevent embolization    Disclaimer: Risk Score calculation is dependent on accuracy of patient problem list and past encounter diagnosis. Plan of Treatment:   1. Stable. Remains SR. Stress test and echo reviewed in detail with patient. Questions/concerns addressed. 2. Continue PO BB- will decrease to 12.5mg PO BID d/t bradycardia (asymptomatic). No AC needed d/t low chadsvasc score. 3. OK for discharge from CV standpoint with OP f/u in clinic in 2 weeks.      Electronically signed by TISHA Young CNP on 8/19/2021 at [address]  [phone]

## 2021-08-19 NOTE — PROGRESS NOTES
NEUROLOGY INPATIENT PROGRESS NOTE    Patient- Yelitza Hubbard    MRN -  [id number]   Acct # - [de-identified]      - 1970    46 y.o. Subjective: The patient is seen as follow up for acute vertigo. Patient is alert at this time. No complaints, she feels happy, her twin sister is with her    Objective:   /88   Pulse 59   Temp 97.5 °F (36.4 °C) (Oral)   Resp 14   Ht 5' 1\" (1.549 m)   Wt 166 lb 7.2 oz (75.5 kg)   SpO2 97%   BMI 31.45 kg/m²     Intake/Output Summary (Last 24 hours) at 2021 0949  Last data filed at 2021 0528  Gross per 24 hour   Intake 450 ml   Output --   Net 450 ml       Physical Exam:  General:  Awake, up in chair,  not  in distress. Language is intact. HEENT: pink conjunctiva, unicteric sclera, moist oral mucosa. There is no neck lymphadenopathy. Neck: There is no carotid bruits. The Neck is supple. Neuro: CN 2-12 grossly intact with no focal deficits. Power 5/5 Throughout symmetric,  Long tracts are intact. Cerebellar exam is Intact. Sensory exam is intact to light touch. Gait is normal. No abnormal movement. Osteo: There is no LROM of any of the 4 extremity joints, no joint tenderness. Leg edema none  Skin: no lesions, no rash, warm andmoist to touch. Abdomen is soft intact bowel sounds. ROS:    Cardiac: no chest pain. No palpitations.   Renal : no flank pain, no hematuria  Skin: no rash    Medications:     metoprolol tartrate  12.5 mg Oral BID    sodium chloride flush  5-40 mL Intravenous 2 times per day    aspirin  81 mg Oral Daily    famotidine (PEPCID) injection  20 mg Intravenous BID    enoxaparin  1 mg/kg Subcutaneous BID    atorvastatin  40 mg Oral Nightly       Data:   CBC:   Recent Labs     21  0546 21  0513   WBC 6.6 4.8 4.3   HGB 13.0 12.4 12.8    282 238     BMP:    Recent Labs     2146 21  0513    142 140   K 3.5* 3.9 4.1    108* 105   CO2 26 24 24   BUN 9 6 8   CREATININE 0.64 0.48* 0.61   GLUCOSE 94 95 87           No results found for this or any previous visit. No results found for this or any previous visit. No results found for this or any previous visit. No results found for this or any previous visit. Results for orders placed during the hospital encounter of 08/17/21    MRI BRAIN WO CONTRAST    Narrative  EXAMINATION:  MRI OF THE BRAIN WITHOUT CONTRAST  8/18/2021 12:38 pm    TECHNIQUE:  Multiplanar multisequence MRI of the brain was performed without the  administration of intravenous contrast.    COMPARISON:  Head CT 08/17/2021    HISTORY:  ORDERING SYSTEM PROVIDED HISTORY: Dizziness  TECHNOLOGIST PROVIDED HISTORY:  Dizziness  Is the patient pregnant?->No  Reason for Exam: Dizziness    FINDINGS:  INTRACRANIAL STRUCTURES/VENTRICLES: There is no acute infarct. No mass effect  or midline shift. No evidence of an acute intracranial hemorrhage. The  ventricles and sulci are normal in size and configuration. The  sellar/suprasellar regions appear unremarkable. The normal signal voids  within the major intracranial vessels appear maintained. The white matter  signal intensity is within normal limits for patient's age. ORBITS: The visualized portion of the orbits demonstrate no acute abnormality. SINUSES: The visualized paranasal sinuses and mastoid air cells are well  aerated. BONES/SOFT TISSUES: The bone marrow signal intensity appears normal. The soft  tissues demonstrate no acute abnormality. Impression  1. No acute infarct or other acute intracranial process. 2. Age-appropriate brain parenchyma. No results found for this or any previous visit. No results found for this or any previous visit.     Results for orders placed during the hospital encounter of 08/17/21    CT Head WO Contrast    Narrative  EXAMINATION:  CT OF THE HEAD WITHOUT CONTRAST  8/17/2021 9:30 pm    TECHNIQUE:  CT of the head was performed without the administration of intravenous  contrast. Dose modulation, iterative reconstruction, and/or weight based  adjustment of the mA/kV was utilized to reduce the radiation dose to as low  as reasonably achievable. COMPARISON:  None. HISTORY:  ORDERING SYSTEM PROVIDED HISTORY: vertigo  TECHNOLOGIST PROVIDED HISTORY:  vertigo    Decision Support Exception - unselect if not a suspected or confirmed  emergency medical condition->Emergency Medical Condition (MA)  Is the patient pregnant?->No  Reason for Exam: Vertigo  Acuity: Acute  Type of Exam: Initial  Additional signs and symptoms: Pt c/o dizziness, shortness of breath for 1  day. FINDINGS:  BRAIN/VENTRICLES: There is no acute intracranial hemorrhage, mass effect or  midline shift. No abnormal extra-axial fluid collection. The gray-white  differentiation is maintained without evidence of an acute infarct. There is  no evidence of hydrocephalus. ORBITS: The visualized portion of the orbits demonstrate no acute abnormality. SINUSES: The visualized paranasal sinuses and mastoid air cells demonstrate  no acute abnormality. SOFT TISSUES/SKULL:  No acute abnormality of the visualized skull or soft  tissues. Impression  No acute intracranial abnormality. Assessment:  Principal Problem:    New onset atrial fibrillation (HCC)  Active Problems:    Dizziness    Hypokalemia    Chest pain  Resolved Problems:    * No resolved hospital problems. 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 MRI is completely normal  2. Pt has no hx of stroke or other known cardiac or vascular, pt does not need to be on antiplatelets or antiocoagulation for stroke provention, as the benefits do not outweigh the risk of bleeding  3.  Pt has a possible R ICA supraclinoind aneurysm, will plan to get her an elective diagnostic cerebral angiogram in the next 1-3 months  4. Pt can be discharged from the neurological standpoint and see us the neurology clinic. No further neurological workup needed, her vertigo resolved. 5. I recommend pt to get her ears checked in the outpatient ENT clinic. 6. Neurology will sign off, please call if there is question.     Linda Gallagher MD, MD, 8/19/2021 9:49 AM     Michael Gong MD  Attending Neurologist/Neurointensivist

## 2021-08-19 NOTE — DISCHARGE SUMMARY
[facility]    Discharge Summary     Patient ID: Kusum Morgan  :  1970   MRN: [id number]     ACCOUNT:  [de-identified]   Patient's PCP: No primary care provider on file.   Admit Date: 2021   Discharge Date: 2021    Length of Stay: 1  Code Status:  Full Code  Admitting Physician: Cassy Velásquez MD  Discharge Physician: Clement Webb MD     Active Discharge Diagnoses:     Primary Problem  New onset atrial fibrillation [facility])      [facility]    Diagnosis Date Noted    New onset atrial fibrillation ([facility] [I48.91] 2021    Dizziness [R42] 2021    Hypokalemia [E87.6] 2021    Chest pain [R07.9] 2021       Admission Condition:  fair     Discharged Condition: fair    Hospital Stay:     Hospital Course:  Kusum Morgan is a 46 y.o. female who was admitted for the management of New onset atrial fibrillation ([facility] , presented to ER with Dizziness, Shortness of Breath, and Emesis  47-year-old lady class I obesity BMI 30.91 history of mild intermittent asthma non-smoking no COPD admitted with palpitation and chest pain found to atrial fibrillation with rapid ventricular response spontaneously converted to sinus rhythm TSH was 0.8 CTA chest shows no PE  She had a stress test done which was a low risk  She also has some dizzy sensations which she describes as a vertigo neurology consultation was obtained a CTA head neck was done no stroke or stenosis noted questionable small AV malformation which was thought to be insignificant outpatient follow-up with the neurologist  [id number] score was low no anticoagulation no aspirin recommended at this time  Discharge today with outpatient follow-up with PCP and neurology        Significant therapeutic interventions:     Significant Diagnostic Studies:   Labs / Micro:        ,     Radiology:    ECHO Complete 2D W Doppler W Color    Result Date: 2021  [facility] Transthoracic Echocardiography Report (TTE)  Patient Name [facility]      Date of Study               08/18/2021               [facility]   Date of      1970  Gender                      Female  Birth   Age          46 year(s)  Race                           Room Number  2087        Height:                     61 inch, 154.94 cm   Corporate ID [id number]    Weight:                     163 pounds, 73.9 kg  #   Patient Acct [de-identified]   BSA:          1.73 m^2      BMI:      30.8 kg/m^2  #   MR #         [id number]      Sonographer                 Sarita Ramesh   Accession #  [id number]  Interpreting Physician      [address] [facility]   Fellow                   Referring Nurse             TRACEY De La Torre                           Practitioner   Interpreting             Referring Physician         [facility] [address]  Fellow  Type of Study   TTE procedure:2D Echocardiogram, M-Mode, Doppler, Color Doppler. Procedure Date Date: 08/18/2021 Start: 07:46 AM Study Location: [facility] Technical Quality: Fair visualization Indications:Chest pain. Patient Status: Inpatient Height: 61 inches Weight: 163 pounds BSA: 1.73 m^2 BMI: 30.8 kg/m^2 Rhythm: Within normal limits HR: 71 bpm BP: 155/103 mmHg CONCLUSIONS Summary Normal left ventricle size, wall thickness and function with an estimated EF 55%. Mild mitral regurgitation. Signature ----------------------------------------------------------------------------  Electronically signed by Sarita Ramesh(Sonographer) on 08/18/2021 08:21  AM ---------------------------------------------------------------------------- ----------------------------------------------------------------------------  Electronically signed by Nasreen GarciaInterpreting physician) on 08/18/2021  09:23 AM ---------------------------------------------------------------------------- FINDINGS Left Atrium Left atrium is normal in size.  Left Ventricle Normal left ventricle size, wall thickness and function with an estimated EF 55%. No segmental wall motion abnormalities seen. Right Atrium Right atrium is normal in size. Right Ventricle Normal right ventricular size and function. Mitral Valve Normal mitral valve structure and function. Mild mitral regurgitation. Aortic Valve Normal aortic valve structure and function without stenosis or regurgitation. Tricuspid Valve Normal tricuspid valve structure and function. Insignificant tricuspid regurgitation, unable to estimate RVSP. Pulmonic Valve The pulmonic valve is normal in structure. Mild pulmonic insufficiency. Pericardial Effusion No significant pericardial effusion is seen. Miscellaneous Normal aortic root dimension. E/e' average 10.8 IVC normal diameter & inspiratory collapse indicating normal RA filling pressure .  M-mode / 2D Measurements & Calculations:   LVIDd:3.87 cm(3.7 - 5.6 cm)       Diastolic KNLVMM:80 ml  NQHUJ:2.01 cm(2.2 - 4.0 cm)       Systolic ESPLDM:93.3 ml  WTAB:9.14 cm(0.6 - 1.1 cm)        Aortic Root:2.3 cm(2.0 - 3.7 cm)  LVPWd:0.96 cm(0.6 - 1.1 cm)       LA Dimension: 3 cm(1.9 - 4.0 cm)  Fractional Shortenin.67 %     LA volume/Index: 31 ml /18m^2  Calculated LVEF (%): 50.17 %      LVOT:2.1 cm   Mitral:                              Aortic   Peak E-Wave: 1.09 m/s                Peak Velocity: 1.24 m/s  Peak A-Wave: 1.05 m/s                Mean Velocity: 0.84 m/s  E/A Ratio: 1.04                      Peak Gradient: 6.15 mmHg  Peak Gradient: 4.75 mmHg             Mean Gradient: 3 mmHg  Deceleration Time: 271 msec                                        Area (continuity): 2.7 cm^2                                       AV VTI: 29.6 cm  Septal Wall E' velocity:0.08 m/s Lateral Wall E' velocity:0.13 m/s    CT Head WO Contrast    Result Date: 2021  EXAMINATION: CT OF THE HEAD WITHOUT CONTRAST  2021 9:30 pm TECHNIQUE: CT of the head was performed without the administration of intravenous contrast. Dose modulation, iterative reconstruction, and/or weight based adjustment of the mA/kV was utilized to reduce the radiation dose to as low as reasonably achievable. COMPARISON: None. HISTORY: ORDERING SYSTEM PROVIDED HISTORY: vertigo TECHNOLOGIST PROVIDED HISTORY: vertigo Decision Support Exception - unselect if not a suspected or confirmed emergency medical condition->Emergency Medical Condition (MA) Is the patient pregnant?->No Reason for Exam: Vertigo Acuity: Acute Type of Exam: Initial Additional signs and symptoms: Pt c/o dizziness, shortness of breath for 1 day. FINDINGS: BRAIN/VENTRICLES: There is no acute intracranial hemorrhage, mass effect or midline shift. No abnormal extra-axial fluid collection. The gray-white differentiation is maintained without evidence of an acute infarct. There is no evidence of hydrocephalus. ORBITS: The visualized portion of the orbits demonstrate no acute abnormality. SINUSES: The visualized paranasal sinuses and mastoid air cells demonstrate no acute abnormality. SOFT TISSUES/SKULL:  No acute abnormality of the visualized skull or soft tissues. No acute intracranial abnormality. XR CHEST PORTABLE    Result Date: 8/17/2021  EXAMINATION: ONE XRAY VIEW OF THE CHEST 8/17/2021 8:12 pm COMPARISON: 03/19/2020 HISTORY: ORDERING SYSTEM PROVIDED HISTORY: dizzy TECHNOLOGIST PROVIDED HISTORY: dizzy Reason for Exam: PT CO cough with SOB and dizziness X 1 day. Acuity: Acute Type of Exam: Initial FINDINGS: Portable study was obtained at low lung volumes with crowding of lung markings at the bases. No acute confluent airspace disease. No pneumothorax or pleural fluid. Azygous fissure again noted. No acute bone finding. No acute cardiopulmonary disease.      CT CHEST PULMONARY EMBOLISM W CONTRAST    Result Date: 8/17/2021  EXAMINATION: CTA OF THE CHEST 8/17/2021 9:31 pm TECHNIQUE: CTA of the chest was performed after the administration of intravenous contrast.  Multiplanar reformatted images are provided for review. MIP images are provided for review. Dose modulation, iterative reconstruction, and/or weight based adjustment of the mA/kV was utilized to reduce the radiation dose to as low as reasonably achievable. COMPARISON: None. HISTORY: ORDERING SYSTEM PROVIDED HISTORY: elevated ddimer TECHNOLOGIST PROVIDED HISTORY: elevated ddimer Decision Support Exception - unselect if not a suspected or confirmed emergency medical condition->Emergency Medical Condition (MA) Reason for Exam: Elevated d-dimer Acuity: Acute Type of Exam: Initial Additional signs and symptoms: Pt c/o dizziness, shortness of breath for 1 day. FINDINGS: Pulmonary Arteries: Pulmonary arteries are adequately opacified for evaluation. No evidence of intraluminal filling defect to suggest pulmonary embolism. Main pulmonary artery is normal in caliber. Mediastinum: Thoracic aorta demonstrates no evidence of aneurysm or dissection. No pericardial effusion. No lymphadenopathy. The esophagus is grossly unremarkable. Lungs/pleura: Azygous lobe is present. Dependent atelectatic changes. No focal consolidation, pneumothorax or pleural effusion. Calcified granuloma right upper lobe. 3 mm solid noncalcified pulmonary nodule involving the lingula series 4, image 69. Mild lung scarring. Upper Abdomen: No acute findings. Ill-defined arterial enhancement is seen involving the right lobe of the liver likely perfusional in nature. Soft Tissues/Bones: Visualized soft tissues surrounding the chest wall demonstrate no acute findings. Osseous structures demonstrate degenerative change. 1. No CT evidence of acute pulmonary embolism. No acute cardiopulmonary process. 2. 3 mm solid noncalcified pulmonary nodule involving the lingula. Please see follow-up recommendations below. RECOMMENDATIONS: 3 mm left solid pulmonary nodule. No routine follow-up imaging is recommended.  These guidelines do not apply to immunocompromised patients and patients with cancer. Follow up in patients with significant comorbidities as clinically warranted. For lung cancer screening, adhere to Lung-RADS guidelines. Reference: Radiology. 2017; 284(1):228-43. CTA HEAD NECK W CONTRAST    Result Date: 8/18/2021  EXAMINATION: CTA OF THE HEAD AND NECK WITH CONTRAST 8/18/2021 12:06 am: TECHNIQUE: CTA of the head and neck was performed with the administration of intravenous contrast. Multiplanar reformatted images are provided for review. MIP images are provided for review. Stenosis of the internal carotid arteries measured using NASCET criteria. Dose modulation, iterative reconstruction, and/or weight based adjustment of the mA/kV was utilized to reduce the radiation dose to as low as reasonably achievable. COMPARISON: CT head done 08/17/2021. HISTORY: ORDERING SYSTEM PROVIDED HISTORY: vertigo TECHNOLOGIST PROVIDED HISTORY: vertigo Decision Support Exception - unselect if not a suspected or confirmed emergency medical condition->Emergency Medical Condition (MA) Reason for Exam: Vertigo Acuity: Acute Type of Exam: Initial Additional signs and symptoms: Pt c/o dizziness and shortness of breath for 1 day. FINDINGS: CTA NECK: AORTIC ARCH/ARCH VESSELS: No dissection or arterial injury. No significant stenosis of the brachiocephalic or subclavian arteries. CAROTID ARTERIES: No dissection, arterial injury, or hemodynamically significant stenosis by NASCET criteria. VERTEBRAL ARTERIES: No dissection, arterial injury, or significant stenosis. SOFT TISSUES: The lung apices are clear. No cervical or superior mediastinal lymphadenopathy. The larynx and pharynx are unremarkable. No acute abnormality of the salivary and thyroid glands. BONES: No acute osseous abnormality. CTA HEAD: ANTERIOR CIRCULATION: No significant stenosis of the intracranial internal carotid, anterior cerebral, or middle cerebral arteries.   2 mm outpouching along the inferior aspect of the supraclinoid right intracranial internal carotid artery could represent a small aneurysm or infundibulum (series 5, image 33). POSTERIOR CIRCULATION: No significant stenosis of the vertebral, basilar, or posterior cerebral arteries. No aneurysm. OTHER: No dural venous sinus thrombosis on this non-dedicated study. BRAIN: No mass effect or midline shift. No extra-axial fluid collection. The gray-white differentiation is maintained. 1.  No large vessel occlusion. No focal hemodynamically significant stenosis or occlusion in the large arteries of the head and neck. 2.  Subtle 2 mm outpouching along the inferior aspect of the supraclinoid right intracranial internal carotid artery could represent a small aneurysm or infundibulum. MRI BRAIN WO CONTRAST    Result Date: 8/18/2021  EXAMINATION: MRI OF THE BRAIN WITHOUT CONTRAST  8/18/2021 12:38 pm TECHNIQUE: Multiplanar multisequence MRI of the brain was performed without the administration of intravenous contrast. COMPARISON: Head CT 08/17/2021 HISTORY: ORDERING SYSTEM PROVIDED HISTORY: Dizziness TECHNOLOGIST PROVIDED HISTORY: Dizziness Is the patient pregnant?->No Reason for Exam: Dizziness FINDINGS: INTRACRANIAL STRUCTURES/VENTRICLES: There is no acute infarct. No mass effect or midline shift. No evidence of an acute intracranial hemorrhage. The ventricles and sulci are normal in size and configuration. The sellar/suprasellar regions appear unremarkable. The normal signal voids within the major intracranial vessels appear maintained. The white matter signal intensity is within normal limits for patient's age. ORBITS: The visualized portion of the orbits demonstrate no acute abnormality. SINUSES: The visualized paranasal sinuses and mastoid air cells are well aerated. BONES/SOFT TISSUES: The bone marrow signal intensity appears normal. The soft tissues demonstrate no acute abnormality. 1. No acute infarct or other acute intracranial process.  2. Age-appropriate brain parenchyma. NM Cardiac Stress Test Nuclear Imaging    Result Date: 8/18/2021  EXAMINATION: MYOCARDIAL PERFUSION IMAGING 8/18/2021 9:35 am TECHNIQUE: For the rest study, 12.6 mCi of Tc 99 labeled sestamibi were injected. SPECT images were acquired. Under cardiology supervision, 0.4mg Kristal Neither was infused. After pharmacologic stress, 33.6 mCi of Tc 99 labeled sestamibi were injected. SPECT images with ECG gating were acquired. COMPARISON: None Available. HISTORY: ORDERING SYSTEM PROVIDED HISTORY: New onset afib, chest pain TECHNOLOGIST PROVIDED HISTORY: Reason for Exam: Chest pain Reason for Exam: EKG abnormalities Procedure Type->Rx New onset afib, chest pain Is the patient pregnant?->No Reason for Exam: New onset afib, CP Acuity: Unknown Type of Exam: Unknown FINDINGS: Images interpreted utilizing [employer] system and [place]. There is normal distribution of radiotracer throughout the myocardium without evidence for a significant reversible or fixed perfusion defect. The gated images show no wall motion abnormalities. Normal myocardial thickening. Perfusion scores are visually adjusted to account for artifact. Summed stress score:  1 Summed rest score:  0 Summed reversibility score:  1 Function: End diastolic volume:  04VV Left ventricular ejection fraction:  58% TID score:  1.03 (scores greater than 1.39 are considered elevated for Lexiscan stress with Tc99m) Notes concerning risk stratification: Risk stratification incorporates both clinical history and some testing results. Final risk determination is the responsibility of the ordering provider as other patient information and test results may increase or decrease the risk assessment reported for this examination. Risk stratification criteria are adapted from \"Noninvasive Risk Stratification\" criteria from [employer].   [place], ACC/AATS/AHA/ASE/ASNC/SCAI/SCCT/STS 2017 Appropriate Use Criteria For Coronary Revascularization in Patients With Stable Ischemic Heart Disease [facility] Volume 69, Issue 17, May 2017 High risk (>3% annual death or MI) 1. Severe resting LV dysfunction (LVEF <35%) not readily explained by non coronary causes 2. Resting perfusion abnormalities greater than 10% of the myocardium in patients without prior history or evidence of MI 3. Stress-induced perfusion abnormalities encumbering greater than or equal to 10% myocardium or stress segmental scores indicating multiple vascular territories with abnormalities 4. Stress-induced LV dilatation (TID ratio greater than 1.19 for exercise and greater than 1.39 for regadenoson) Intermediate risk (1% to 3% annual death or MI) 1. Mild/moderate resting LV dysfunction (LVEF 35% to 49%) not readily explained by non coronary causes. 2. Resting perfusion abnormalities in 5%-9.9% of the myocardium in patients without a history or prior evidence of MI 3. Stress-induced perfusion abnormality encumbering 5%-9.9% of the myocardium or stress segmental scores indicating 1 vascular territory with abnormalities but without LV dilation 4. Small wall motion abnormality involving 1-2 segments and only 1 coronary bed. Low Risk (Less than 1% annual death or MI) 1. Normal or small myocardial perfusion defect at rest or with stress encumbering less than 5% of the myocardium. No stress-induced ischemia. No infarct. Normal LVEF. Risk stratification: Low         Consultations:    Consults:     Final Specialist Recommendations/Findings:   IP CONSULT TO INTERNAL MEDICINE  IP CONSULT TO CARDIOLOGY  IP CONSULT TO NEUROLOGY      The patient was seen and examined on day of discharge and this discharge summary is in conjunction with any daily progress note from day of discharge.     Discharge plan:     Disposition: Home    Physician Follow Up:     Tariq Brandt [facility] [address]  [address] [address] [phone]    Call  FOLLOW UP IN 1-2 MONTHS FOR ANEURSYM WORK UP    Rakesh España, [address] [place] [address] [facility] [address]  [phone]    Go on 9/8/2021  [facility]  [facility], Appointment Time at 9:30a.m., please arrive 15 minutes early,  and wear a  mask, if you are unable to keep appt, please call to Reschedule,    [facility]  [address] [facility]. [address]  Schedule an appointment as soon as possible for a visit in 2 weeks  for hospital f/u with cardiology        Requiring Further Evaluation/Follow Up POST HOSPITALIZATION/Incidental Findings:    Diet: cardiac diet    Activity: As tolerated    Instructions to Patient:     Discharge Medications:      Medication List      START taking these medications    atorvastatin 40 MG tablet  Commonly known as: LIPITOR  Take 1 tablet by mouth nightly     meclizine 25 MG tablet  Commonly known as: ANTIVERT  Take 1 tablet by mouth 3 times daily as needed for Dizziness     metoprolol tartrate 25 MG tablet  Commonly known as: LOPRESSOR  Take 0.5 tablets by mouth 2 times daily           Where to Get Your Medications      These medications were sent to [address] [address]    Phone: [phone]   · atorvastatin 40 MG tablet  · meclizine 25 MG tablet  · metoprolol tartrate 25 MG tablet         Time Spent on discharge is  35 mins in patient examination, evaluation, counseling as well as medication reconciliation, prescriptions for required medications, discharge plan and follow up. Electronically signed by   Rafael Carpenter MD  8/19/2021  9:48 AM      Thank you Dr. Vale Quesada primary care provider on file. for the opportunity to be involved in this patient's care.

## 2021-08-19 NOTE — PLAN OF CARE
Problem: Pain:  Goal: Pain level will decrease  Description: Pain level will decrease  8/19/2021 0418 by South Pittman RN  Outcome: Ongoing   Pt had no c/o pain this shift    Problem: Cardiac:  Goal: Hemodynamic stability will improve  Description: Hemodynamic stability will improve  8/19/2021 0418 by South Pittman RN  Outcome: Ongoing

## 2021-08-19 NOTE — PLAN OF CARE
Problem: Pain:  Goal: Pain level will decrease  Description: Pain level will decrease  8/19/2021 1217 by Brendan Owen RN  Outcome: Met This Shift  8/19/2021 1216 by Brendan Owne RN  Outcome: Ongoing  8/19/2021 0418 by Anjelica Sidhu RN  Outcome: Ongoing  Goal: Control of acute pain  Description: Control of acute pain  8/19/2021 1217 by Brendan Owen RN  Outcome: Met This Shift  8/19/2021 1216 by Brendan Owen RN  Outcome: Ongoing  8/19/2021 0418 by Anjelica Sidhu RN  Outcome: Ongoing  Goal: Control of chronic pain  Description: Control of chronic pain  8/19/2021 1217 by Brendan Owen RN  Outcome: Met This Shift  8/19/2021 1216 by Brendan Owen RN  Outcome: Ongoing  8/19/2021 0418 by Anjelica Sidhu RN  Outcome: Ongoing     Problem: Cardiac:  Goal: Hemodynamic stability will improve  Description: Hemodynamic stability will improve  8/19/2021 1217 by Brendan Owen RN  Outcome: Met This Shift  8/19/2021 1216 by Brendan Owen RN  Outcome: Ongoing  8/19/2021 0418 by Anjelica Sidhu RN  Outcome: Ongoing

## 2021-08-19 NOTE — FLOWSHEET NOTE
Pt discharge papers reviewed, questions regarding new meds addressed, and follow up appts. Catheter removed and intact. Escorted to vehicle with sister.

## 2021-08-19 NOTE — PROGRESS NOTES
CLINICAL PHARMACY NOTE: MEDS TO BEDS    Total # of Prescriptions Filled: 2   The following medications were delivered to the patient:  Lopressor and lipitor    Additional Documentation:  Meclizine was over the counter and sold to patient-copay collected

## 2021-08-19 NOTE — PROGRESS NOTES
[address]   OCCUPATIONAL THERAPY MISSED TREATMENT NOTE   INPATIENT   Date: 21  Patient Name: Yelitza Hubbard       Room: 0387/2791-05  MRN: [id number]   Account #: [de-identified]    : 1970  (46 y.o.)  Gender: female                 REASON FOR MISSED TREATMENT:  Patient reports and demonstrates independence with self-care and mobility. Patient denies any concerns regarding self-care for discharge home. Patient reports her dizziness is better as well. No need for skilled OT services at this time.   -   OT being discontinued at this time.  Patient functioning at Premorbid Level  No further needs      Bubba Turner, OT

## 2021-08-19 NOTE — PROGRESS NOTES
Physical Therapy        Physical Therapy Cancel Note      DATE: 2021    NAME: Melania Nicole  MRN: [id number]   : 1970      Patient not seen this date for Physical Therapy due to:    Patient independent with functional mobility. Will defer PT evaluation at this time. Please reorder PT if future needs arise. 21: D/C PT - pt IND in room, demonstrates steady gait. No dizziness. Declines difficulty with stairs.  No skilled PT. [phone]      Electronically signed by Swathi Grimes PT on 2021 at 10:19 AM

## 2024-03-07 ENCOUNTER — APPOINTMENT (OUTPATIENT)
Dept: GENERAL RADIOLOGY | Age: 54
End: 2024-03-07

## 2024-03-07 ENCOUNTER — APPOINTMENT (OUTPATIENT)
Dept: CT IMAGING | Age: 54
End: 2024-03-07

## 2024-03-07 ENCOUNTER — HOSPITAL ENCOUNTER (INPATIENT)
Age: 54
LOS: 1 days | Discharge: HOME | End: 2024-03-08
Attending: EMERGENCY MEDICINE | Admitting: INTERNAL MEDICINE

## 2024-03-07 DIAGNOSIS — I48.0 PAF (PAROXYSMAL ATRIAL FIBRILLATION) (HCC): ICD-10-CM

## 2024-03-07 DIAGNOSIS — I48.91 UNCONTROLLED ATRIAL FIBRILLATION (HCC): ICD-10-CM

## 2024-03-07 DIAGNOSIS — Z91.148 NONCOMPLIANCE WITH MEDICATION REGIMEN: ICD-10-CM

## 2024-03-07 DIAGNOSIS — H81.10 BENIGN PAROXYSMAL POSITIONAL VERTIGO, UNSPECIFIED LATERALITY: Primary | ICD-10-CM

## 2024-03-07 LAB
ALBUMIN SERPL-MCNC: 4.7 G/DL (ref 3.5–5.2)
ALP SERPL-CCNC: 88 U/L (ref 35–104)
ALT SERPL-CCNC: 22 U/L (ref 5–33)
ANION GAP SERPL CALCULATED.3IONS-SCNC: 13 MMOL/L (ref 9–17)
AST SERPL-CCNC: 31 U/L
BASOPHILS # BLD: 0 K/UL (ref 0–0.2)
BASOPHILS NFR BLD: 1 % (ref 0–2)
BILIRUB SERPL-MCNC: 0.4 MG/DL (ref 0.3–1.2)
BUN SERPL-MCNC: 13 MG/DL (ref 6–20)
CALCIUM SERPL-MCNC: 9.7 MG/DL (ref 8.6–10.4)
CHLORIDE SERPL-SCNC: 100 MMOL/L (ref 98–107)
CO2 SERPL-SCNC: 24 MMOL/L (ref 20–31)
CREAT SERPL-MCNC: 0.6 MG/DL (ref 0.5–0.9)
EOSINOPHIL # BLD: 0.1 K/UL (ref 0–0.4)
EOSINOPHILS RELATIVE PERCENT: 2 % (ref 0–4)
ERYTHROCYTE [DISTWIDTH] IN BLOOD BY AUTOMATED COUNT: 12.9 % (ref 11.5–14.9)
GFR SERPL CREATININE-BSD FRML MDRD: >60 ML/MIN/1.73M2
GLUCOSE SERPL-MCNC: 149 MG/DL (ref 70–99)
HCT VFR BLD AUTO: 41.6 % (ref 36–46)
HGB BLD-MCNC: 13.8 G/DL (ref 12–16)
LIPASE SERPL-CCNC: 42 U/L (ref 13–60)
LYMPHOCYTES NFR BLD: 2.1 K/UL (ref 1–4.8)
LYMPHOCYTES RELATIVE PERCENT: 36 % (ref 24–44)
MAGNESIUM SERPL-MCNC: 2.1 MG/DL (ref 1.6–2.6)
MCH RBC QN AUTO: 28.6 PG (ref 26–34)
MCHC RBC AUTO-ENTMCNC: 33.2 G/DL (ref 31–37)
MCV RBC AUTO: 86.3 FL (ref 80–100)
MONOCYTES NFR BLD: 0.3 K/UL (ref 0.1–1.3)
MONOCYTES NFR BLD: 6 % (ref 1–7)
NEUTROPHILS NFR BLD: 55 % (ref 36–66)
NEUTS SEG NFR BLD: 3.4 K/UL (ref 1.3–9.1)
PLATELET # BLD AUTO: 281 K/UL (ref 150–450)
PMV BLD AUTO: 7.6 FL (ref 6–12)
POTASSIUM SERPL-SCNC: 3.9 MMOL/L (ref 3.7–5.3)
PROT SERPL-MCNC: 7.8 G/DL (ref 6.4–8.3)
RBC # BLD AUTO: 4.82 M/UL (ref 4–5.2)
SODIUM SERPL-SCNC: 137 MMOL/L (ref 135–144)
T4 FREE SERPL-MCNC: 1.2 NG/DL (ref 0.9–1.7)
TROPONIN I SERPL HS-MCNC: 7 NG/L (ref 0–14)
TROPONIN I SERPL HS-MCNC: <6 NG/L (ref 0–14)
TSH SERPL DL<=0.05 MIU/L-ACNC: 1.02 UIU/ML (ref 0.3–5)
WBC OTHER # BLD: 6 K/UL (ref 3.5–11)

## 2024-03-07 PROCEDURE — 2060000000 HC ICU INTERMEDIATE R&B

## 2024-03-07 PROCEDURE — 99223 1ST HOSP IP/OBS HIGH 75: CPT | Performed by: INTERNAL MEDICINE

## 2024-03-07 PROCEDURE — 6360000002 HC RX W HCPCS

## 2024-03-07 PROCEDURE — 84439 ASSAY OF FREE THYROXINE: CPT

## 2024-03-07 PROCEDURE — 80053 COMPREHEN METABOLIC PANEL: CPT

## 2024-03-07 PROCEDURE — 71045 X-RAY EXAM CHEST 1 VIEW: CPT

## 2024-03-07 PROCEDURE — 70450 CT HEAD/BRAIN W/O DYE: CPT

## 2024-03-07 PROCEDURE — 84443 ASSAY THYROID STIM HORMONE: CPT

## 2024-03-07 PROCEDURE — 85025 COMPLETE CBC W/AUTO DIFF WBC: CPT

## 2024-03-07 PROCEDURE — 83735 ASSAY OF MAGNESIUM: CPT

## 2024-03-07 PROCEDURE — 96375 TX/PRO/DX INJ NEW DRUG ADDON: CPT

## 2024-03-07 PROCEDURE — 93005 ELECTROCARDIOGRAM TRACING: CPT | Performed by: EMERGENCY MEDICINE

## 2024-03-07 PROCEDURE — 83690 ASSAY OF LIPASE: CPT

## 2024-03-07 PROCEDURE — 99285 EMERGENCY DEPT VISIT HI MDM: CPT

## 2024-03-07 PROCEDURE — 84484 ASSAY OF TROPONIN QUANT: CPT

## 2024-03-07 PROCEDURE — 2500000003 HC RX 250 WO HCPCS

## 2024-03-07 PROCEDURE — 36415 COLL VENOUS BLD VENIPUNCTURE: CPT

## 2024-03-07 PROCEDURE — 70498 CT ANGIOGRAPHY NECK: CPT

## 2024-03-07 PROCEDURE — 6370000000 HC RX 637 (ALT 250 FOR IP): Performed by: EMERGENCY MEDICINE

## 2024-03-07 PROCEDURE — 6360000004 HC RX CONTRAST MEDICATION: Performed by: EMERGENCY MEDICINE

## 2024-03-07 PROCEDURE — 96374 THER/PROPH/DIAG INJ IV PUSH: CPT

## 2024-03-07 PROCEDURE — 2580000003 HC RX 258: Performed by: EMERGENCY MEDICINE

## 2024-03-07 PROCEDURE — 96361 HYDRATE IV INFUSION ADD-ON: CPT

## 2024-03-07 PROCEDURE — 6360000002 HC RX W HCPCS: Performed by: EMERGENCY MEDICINE

## 2024-03-07 RX ORDER — ONDANSETRON 4 MG/1
4 TABLET, ORALLY DISINTEGRATING ORAL EVERY 8 HOURS PRN
Status: DISCONTINUED | OUTPATIENT
Start: 2024-03-07 | End: 2024-03-08 | Stop reason: HOSPADM

## 2024-03-07 RX ORDER — ENOXAPARIN SODIUM 100 MG/ML
40 INJECTION SUBCUTANEOUS DAILY
Status: DISCONTINUED | OUTPATIENT
Start: 2024-03-07 | End: 2024-03-08 | Stop reason: HOSPADM

## 2024-03-07 RX ORDER — ONDANSETRON 2 MG/ML
4 INJECTION INTRAMUSCULAR; INTRAVENOUS EVERY 6 HOURS PRN
Status: DISCONTINUED | OUTPATIENT
Start: 2024-03-07 | End: 2024-03-08 | Stop reason: HOSPADM

## 2024-03-07 RX ORDER — ACETAMINOPHEN 650 MG/1
650 SUPPOSITORY RECTAL EVERY 6 HOURS PRN
Status: DISCONTINUED | OUTPATIENT
Start: 2024-03-07 | End: 2024-03-08 | Stop reason: HOSPADM

## 2024-03-07 RX ORDER — MECLIZINE HYDROCHLORIDE 25 MG/1
25 TABLET ORAL ONCE
Status: COMPLETED | OUTPATIENT
Start: 2024-03-07 | End: 2024-03-07

## 2024-03-07 RX ORDER — MAGNESIUM SULFATE HEPTAHYDRATE 40 MG/ML
2000 INJECTION, SOLUTION INTRAVENOUS PRN
Status: DISCONTINUED | OUTPATIENT
Start: 2024-03-07 | End: 2024-03-08 | Stop reason: HOSPADM

## 2024-03-07 RX ORDER — POTASSIUM CHLORIDE 20 MEQ/1
40 TABLET, EXTENDED RELEASE ORAL PRN
Status: DISCONTINUED | OUTPATIENT
Start: 2024-03-07 | End: 2024-03-08 | Stop reason: HOSPADM

## 2024-03-07 RX ORDER — ACETAMINOPHEN 325 MG/1
650 TABLET ORAL EVERY 6 HOURS PRN
Status: DISCONTINUED | OUTPATIENT
Start: 2024-03-07 | End: 2024-03-08 | Stop reason: HOSPADM

## 2024-03-07 RX ORDER — SODIUM CHLORIDE 9 MG/ML
INJECTION, SOLUTION INTRAVENOUS PRN
Status: DISCONTINUED | OUTPATIENT
Start: 2024-03-07 | End: 2024-03-08 | Stop reason: HOSPADM

## 2024-03-07 RX ORDER — METOPROLOL TARTRATE 1 MG/ML
5 INJECTION, SOLUTION INTRAVENOUS EVERY 6 HOURS PRN
Status: DISCONTINUED | OUTPATIENT
Start: 2024-03-07 | End: 2024-03-07

## 2024-03-07 RX ORDER — MECLIZINE HYDROCHLORIDE 25 MG/1
12.5 TABLET ORAL 3 TIMES DAILY PRN
Status: DISCONTINUED | OUTPATIENT
Start: 2024-03-07 | End: 2024-03-08 | Stop reason: HOSPADM

## 2024-03-07 RX ORDER — SODIUM CHLORIDE 0.9 % (FLUSH) 0.9 %
5-40 SYRINGE (ML) INJECTION PRN
Status: DISCONTINUED | OUTPATIENT
Start: 2024-03-07 | End: 2024-03-08 | Stop reason: HOSPADM

## 2024-03-07 RX ORDER — POLYETHYLENE GLYCOL 3350 17 G/17G
17 POWDER, FOR SOLUTION ORAL DAILY PRN
Status: DISCONTINUED | OUTPATIENT
Start: 2024-03-07 | End: 2024-03-08 | Stop reason: HOSPADM

## 2024-03-07 RX ORDER — DIPHENHYDRAMINE HYDROCHLORIDE 50 MG/ML
25 INJECTION INTRAMUSCULAR; INTRAVENOUS ONCE
Status: COMPLETED | OUTPATIENT
Start: 2024-03-07 | End: 2024-03-07

## 2024-03-07 RX ORDER — METOPROLOL TARTRATE 1 MG/ML
5 INJECTION, SOLUTION INTRAVENOUS ONCE
Status: COMPLETED | OUTPATIENT
Start: 2024-03-07 | End: 2024-03-07

## 2024-03-07 RX ORDER — SODIUM CHLORIDE 0.9 % (FLUSH) 0.9 %
10 SYRINGE (ML) INJECTION PRN
Status: DISCONTINUED | OUTPATIENT
Start: 2024-03-07 | End: 2024-03-08 | Stop reason: HOSPADM

## 2024-03-07 RX ORDER — 0.9 % SODIUM CHLORIDE 0.9 %
1000 INTRAVENOUS SOLUTION INTRAVENOUS ONCE
Status: COMPLETED | OUTPATIENT
Start: 2024-03-07 | End: 2024-03-07

## 2024-03-07 RX ORDER — ONDANSETRON 2 MG/ML
4 INJECTION INTRAMUSCULAR; INTRAVENOUS ONCE
Status: COMPLETED | OUTPATIENT
Start: 2024-03-07 | End: 2024-03-07

## 2024-03-07 RX ORDER — MECLIZINE HCL 12.5 MG/1
12.5 TABLET ORAL 3 TIMES DAILY PRN
COMMUNITY

## 2024-03-07 RX ORDER — 0.9 % SODIUM CHLORIDE 0.9 %
100 INTRAVENOUS SOLUTION INTRAVENOUS ONCE
Status: COMPLETED | OUTPATIENT
Start: 2024-03-07 | End: 2024-03-07

## 2024-03-07 RX ORDER — DILTIAZEM HYDROCHLORIDE 60 MG/1
60 TABLET, FILM COATED ORAL ONCE
Status: COMPLETED | OUTPATIENT
Start: 2024-03-07 | End: 2024-03-07

## 2024-03-07 RX ORDER — SODIUM CHLORIDE 0.9 % (FLUSH) 0.9 %
5-40 SYRINGE (ML) INJECTION EVERY 12 HOURS SCHEDULED
Status: DISCONTINUED | OUTPATIENT
Start: 2024-03-07 | End: 2024-03-08 | Stop reason: HOSPADM

## 2024-03-07 RX ORDER — POTASSIUM CHLORIDE 7.45 MG/ML
10 INJECTION INTRAVENOUS PRN
Status: DISCONTINUED | OUTPATIENT
Start: 2024-03-07 | End: 2024-03-08 | Stop reason: HOSPADM

## 2024-03-07 RX ORDER — METOPROLOL TARTRATE 1 MG/ML
5 INJECTION, SOLUTION INTRAVENOUS EVERY 6 HOURS
Status: DISCONTINUED | OUTPATIENT
Start: 2024-03-07 | End: 2024-03-07

## 2024-03-07 RX ADMIN — ENOXAPARIN SODIUM 40 MG: 100 INJECTION SUBCUTANEOUS at 18:07

## 2024-03-07 RX ADMIN — ONDANSETRON 4 MG: 2 INJECTION INTRAMUSCULAR; INTRAVENOUS at 10:11

## 2024-03-07 RX ADMIN — IOPAMIDOL 75 ML: 755 INJECTION, SOLUTION INTRAVENOUS at 10:53

## 2024-03-07 RX ADMIN — DIPHENHYDRAMINE HYDROCHLORIDE 25 MG: 50 INJECTION INTRAMUSCULAR; INTRAVENOUS at 10:12

## 2024-03-07 RX ADMIN — MECLIZINE HYDROCHLORIDE 25 MG: 25 TABLET ORAL at 10:26

## 2024-03-07 RX ADMIN — SODIUM CHLORIDE 100 ML: 9 INJECTION, SOLUTION INTRAVENOUS at 10:54

## 2024-03-07 RX ADMIN — DILTIAZEM HYDROCHLORIDE 60 MG: 60 TABLET, FILM COATED ORAL at 12:44

## 2024-03-07 RX ADMIN — SODIUM CHLORIDE 1000 ML: 9 INJECTION, SOLUTION INTRAVENOUS at 10:10

## 2024-03-07 RX ADMIN — SODIUM CHLORIDE, PRESERVATIVE FREE 10 ML: 5 INJECTION INTRAVENOUS at 10:53

## 2024-03-07 RX ADMIN — METOPROLOL TARTRATE 5 MG: 1 INJECTION, SOLUTION INTRAVENOUS at 18:07

## 2024-03-07 ASSESSMENT — ENCOUNTER SYMPTOMS
TROUBLE SWALLOWING: 0
DIARRHEA: 0
COLOR CHANGE: 0
CHOKING: 0
WHEEZING: 0
SHORTNESS OF BREATH: 1
FACIAL SWELLING: 0
VOMITING: 1
COUGH: 0
ABDOMINAL PAIN: 0
NAUSEA: 1
BACK PAIN: 0
SORE THROAT: 0
CONSTIPATION: 0

## 2024-03-07 ASSESSMENT — LIFESTYLE VARIABLES
HOW OFTEN DO YOU HAVE A DRINK CONTAINING ALCOHOL: NEVER
HOW MANY STANDARD DRINKS CONTAINING ALCOHOL DO YOU HAVE ON A TYPICAL DAY: PATIENT DOES NOT DRINK

## 2024-03-07 ASSESSMENT — PAIN - FUNCTIONAL ASSESSMENT: PAIN_FUNCTIONAL_ASSESSMENT: NONE - DENIES PAIN

## 2024-03-07 NOTE — PROGRESS NOTES
Pt arrived to floor from ED to room 2115.  Vitals taken and telemetry applied.  Pt c/o dizziness with position change/ambulation. Pt having intermittent chest tightness, pt states improvement with rest and deep breathing, worsens with anxiety/exertion.  See doc flowsheet for details. Call light within reach, and pt educated on its use. Bed in lowest position, and locked. Side rails up x 2. Denied further questions or needs at this time. Will continue to monitor.

## 2024-03-07 NOTE — ED TRIAGE NOTES
Mode of arrival (squad #, walk in, police, etc) : walk in        Chief complaint(s): dizziness, nausea, vomiting        Arrival Note (brief scenario, treatment PTA, etc).: Patient c/o dizziness, nausea and emesis        C= \"Have you ever felt that you should Cut down on your drinking?\"  No  A= \"Have people Annoyed you by criticizing your drinking?\"  No  G= \"Have you ever felt bad or Guilty about your drinking?\"  No  E= \"Have you ever had a drink as an Eye-opener first thing in the morning to steady your nerves or to help a hangover?\"  No      Deferred []      Reason for deferring: N/A    *If yes to two or more: probable alcohol abuse.*

## 2024-03-07 NOTE — PLAN OF CARE
Please have patient or POA answer all MRI Screening questions in Epic. Pt must be dressed in hospital clothes for this exam. Thanks!

## 2024-03-07 NOTE — ED PROVIDER NOTES
EMERGENCY DEPARTMENT ENCOUNTER    Pt Name: Татьяна Lui  MRN: [id number]  Birthdate 1970  Date of evaluation: 3/7/24  CHIEF COMPLAINT       Chief Complaint   Patient presents with    Dizziness    Nausea    Emesis     HISTORY OF PRESENT ILLNESS   Presenting with sudden onset dizziness with nausea and vomiting.  Patient has a history of atrial fibrillation and has not been on her medications.  She was tachycardic according to her and was concerned that it was the atrial fibrillation that was causing her symptoms.  She has sudden onset dizziness and describes it as if the room is spinning.  She says that her equilibrium is completely off balance and she feels like she is going to fall over anytime she moves.  Movement exacerbates her symptoms.  She also admits to nausea and vomiting prompting her visit.    The history is provided by the patient.           REVIEW OF SYSTEMS     Review of Systems   Constitutional:  Negative for chills and fever.   HENT:  Negative for congestion.    Eyes:  Negative for visual disturbance.   Respiratory:  Positive for shortness of breath. Negative for cough.    Cardiovascular:  Positive for chest pain.   Gastrointestinal:  Positive for nausea and vomiting. Negative for abdominal pain.   Genitourinary:  Negative for dysuria, flank pain, frequency and urgency.   Musculoskeletal:  Negative for myalgias.   Neurological:  Positive for dizziness and light-headedness. Negative for headaches.   Psychiatric/Behavioral:  Negative for behavioral problems.      PASTMEDICAL HISTORY   History reviewed. No pertinent past medical history.  Past Problem List  Patient Active Problem List   Diagnosis Code    Perimenopause N95.1    Right flank pain R10.9    Musculoskeletal strain T14.8XXA    Family history of diabetes mellitus Z83.3    Chronic right-sided low back pain without sciatica M54.50, G89.29    Thoracic spine pain M54.6    Repetitive use syndrome X50.3XXA    New onset atrial fibrillation (HCC)    Abdominal:      General: There is no distension.      Palpations: Abdomen is soft.      Tenderness: There is no abdominal tenderness.   Musculoskeletal:         General: Normal range of motion.   Skin:     General: Skin is warm.   Neurological:      General: No focal deficit present.      Mental Status: She is alert and oriented to person, place, and time.      GCS: GCS eye subscore is 4. GCS verbal subscore is 5.      Cranial Nerves: Cranial nerves 2-12 are intact.      Sensory: Sensation is intact.      Motor: Motor function is intact.      Coordination: Coordination is intact.   Psychiatric:         Behavior: Behavior normal.         MEDICAL DECISION MAKING / ED COURSE:         1)  Number and Complexity of Problems Addressed at this Encounter  Problem List This Visit: Dizziness, nausea, vomiting    Differential Diagnosis: BPPV or another type of peripheral vertigo    Diagnoses Considered but Do Not Suspect: Central vertigo, cardiogenic etiology to her vertigo, ICH    Presenting with sudden onset dizziness with nausea and vomiting.  Patient has a history of atrial fibrillation and has not been on her medications.  She was tachycardic according to her and was concerned that it was the atrial fibrillation that was causing her symptoms.  She has sudden onset dizziness and describes it as if the room is spinning.  She says that her equilibrium is completely off balance and she feels like she is going to fall over anytime she moves.  Movement exacerbates her symptoms.  She also admits to nausea and vomiting prompting her visit.    2)  Data Reviewed and Analyzed  (Lab and radiology tests/orders below in next section)      My EKG interpretation:    Normal sinus rhythm with a ventricular rate of 67 bpm, LA interval 208 MS, QRS duration 80 MS, QTc 433 MS, no ST or T wave abnormalities identified.    Troponin within normal limits.  Patient's lab results are unremarkable.    Imaging that is independently reviewed and

## 2024-03-07 NOTE — H&P
[facility]  IN-PATIENT SERVICE  [facility]  IN-PATIENT SERVICE   [facility]     HISTORY AND PHYSICAL EXAMINATION            Date:   3/7/2024  Patient name:  Татьяна Lui  Date of admission:  3/7/2024  9:31 AM  MRN:   [id number]  Account:  [id number]  YOB: 1970  PCP:    No primary care provider on file.  Room:   [address] [place] [address]  Code Status:    Full Code    Chief Complaint:     Chief Complaint   Patient presents with    Dizziness    Nausea    Emesis       History Obtained From:     patient    History of Present Illness:       Patient is a 53-year-old female with past medical history of atrial fibrillation, not on any current medications.  Patient presented with acute onset dizziness with associated nausea and vomiting.  Symptoms began earlier this morning while patient was sitting in a chair talking to her sister.  States she feels like the room is spinning around her.  Also states that she cannot balance because of the spinning sensation.  Has continued nausea, vomited twice from this.  Has associated ringing in her ears, left worse than right.  Symptoms are worse with movement, better at rest however constant.  Patient states that she has a history of atrial fibrillation and when she was diagnosed with this she had similar symptoms.  Does also endorse some chest palpitations, racing heartbeat and shortness of breath.  She does not take any medications because she could not afford them. Denies any recent upper respiratory symptoms.     In the ED, patient had CT head and CTA which were negative for any acute abnormalities. PE significant for positive mervin-hallpike, bilateral horizontal nystagmus. Labs wnl, other than glucose 149. Nausea and dizziness subsided with Antivert, Zofran, and Benadryl. EKG showing atrial fibrillation, pt with HR intermittently 130s, received one time Cardizem 60mg.     Past  Pt. Family called up and informed pt. Was alert and oriented and we are unable to keep him against his will and if she wanted or had concerns she can bring him back to ER. identified.     XR CHEST PORTABLE    Result Date: 3/7/2024  EXAMINATION: ONE XRAY VIEW OF THE CHEST 3/7/2024 10:11 am COMPARISON: None. HISTORY: ORDERING SYSTEM PROVIDED HISTORY: n/v, dizziness TECHNOLOGIST PROVIDED HISTORY: n/v, dizziness Reason for Exam: n/v, dizziness; chest pain FINDINGS: Cardiac silhouette is within normal limits. Pulmonary vasculature is within normal limits. The lungs are clear. No pneumothorax is identified. Bony structures are unremarkable.An azygous fissure is noted.     No acute cardiopulmonary process.       Assessment :      Primary Problem  Dizziness    Active Hospital Problems    Diagnosis Date Noted    Dizziness [R42] 08/18/2021       Plan:     Patient status Admit as inpatient in the  Progressive Unit/Step down    # Afib, uncontrolled   - lopressor 5mg   - cardiology consulted, appreciate recommendations  - CHADs-VASc = 1, no need for full dose anticoagulation    # Vertigo 2/2 BPPV vs afib  - meclizine prn  - zofran prn   - Epley maneuver unsuccessful      DVT Prophylaxis: lovenox  GI Prophylaxis: n/a  Diet: adult - regular  Dispo: home  PT/OT/SW: consulted      Consultations:   IP CONSULT TO INTERNAL MEDICINE  IP CONSULT TO CARDIOLOGY  IP CONSULT TO SOCIAL WORK    Patient is admitted as inpatient status because of co-morbiditieslisted above, severity of signs and symptoms as outlined, requirement for current medical therapies and most importantly because of direct risk to patient if care not provided in a hospital setting.    Buddy Hernandez MD  3/7/2024  2:06 PM    Copy sent to Dr. Porter primary care provider on file.   Attending Physician Statement  I have discussed the care of Татьяна Lui and I have examined the patient myself and taken ROS and HPI, including pertinent history and exam findings, with the resident. I have reviewed the key elements of all parts of the encounter with the resident.  I agree with the assessment, plan and orders as documented by the

## 2024-03-07 NOTE — CARE COORDINATION
Case Management Assessment  Initial Evaluation    Date/Time of Evaluation: 3/7/2024 4:01 PM  Assessment Completed by: Татьяна Payne RN    If patient is discharged prior to next notation, then this note serves as note for discharge by case management.    Patient Name: Татьяна Lui                   YOB: 1970  Diagnosis: Dizziness [R42]  Noncompliance with medication regimen [Z91.148]  Uncontrolled atrial fibrillation (HCC) [I48.91]  Benign paroxysmal positional vertigo, unspecified laterality [H81.10]                   Date / Time: 3/7/2024  9:31 AM    Patient Admission Status: Inpatient   Readmission Risk (Low < 19, Mod (19-27), High > 27): No data recorded  Current PCP: No primary care provider on file.  PCP verified by CM?  (Pt has No PCP, Wants to follow at [facility].)    Chart Reviewed: Yes      History Provided by: Patient  Patient Orientation: Alert and Oriented    Patient Cognition: Alert    Hospitalization in the last 30 days (Readmission):  No    If yes, Readmission Assessment in CM Navigator will be completed.    Advance Directives:      Code Status: Full Code   Patient's Primary Decision Maker is:        Discharge Planning:    Patient lives with: Spouse/Significant Other Type of Home: House  Primary Care Giver: Self  Patient Support Systems include: Spouse/Significant Other, Children   Current Financial resources: None  Current community resources: None  Current services prior to admission: None            Current DME:              Type of Home Care services:  None    ADLS  Prior functional level: Independent in ADLs/IADLs  Current functional level: Independent in ADLs/IADLs    PT AM-PAC:   /24  OT AM-PAC:   /24    Family can provide assistance at DC: Yes  Would you like Case Management to discuss the discharge plan with any other family members/significant others, and if so, who? No  Plans to Return to Present Housing: Yes  Other Identified Issues/Barriers to RETURNING to current

## 2024-03-08 VITALS
BODY MASS INDEX: 35.53 KG/M2 | RESPIRATION RATE: 18 BRPM | DIASTOLIC BLOOD PRESSURE: 88 MMHG | SYSTOLIC BLOOD PRESSURE: 135 MMHG | HEIGHT: 60 IN | HEART RATE: 73 BPM | OXYGEN SATURATION: 98 % | TEMPERATURE: 98.5 F | WEIGHT: 181 LBS

## 2024-03-08 PROBLEM — H81.10 BENIGN PAROXYSMAL POSITIONAL VERTIGO: Status: ACTIVE | Noted: 2024-03-08

## 2024-03-08 PROBLEM — I48.0 PAF (PAROXYSMAL ATRIAL FIBRILLATION) (HCC): Status: ACTIVE | Noted: 2021-08-18

## 2024-03-08 LAB
ANION GAP SERPL CALCULATED.3IONS-SCNC: 11 MMOL/L (ref 9–17)
BASOPHILS # BLD: 0 K/UL (ref 0–0.2)
BASOPHILS NFR BLD: 1 % (ref 0–2)
BUN SERPL-MCNC: 13 MG/DL (ref 6–20)
CALCIUM SERPL-MCNC: 9.4 MG/DL (ref 8.6–10.4)
CHLORIDE SERPL-SCNC: 104 MMOL/L (ref 98–107)
CO2 SERPL-SCNC: 26 MMOL/L (ref 20–31)
CREAT SERPL-MCNC: 0.7 MG/DL (ref 0.5–0.9)
EOSINOPHIL # BLD: 0.1 K/UL (ref 0–0.4)
EOSINOPHILS RELATIVE PERCENT: 1 % (ref 0–4)
ERYTHROCYTE [DISTWIDTH] IN BLOOD BY AUTOMATED COUNT: 13.1 % (ref 11.5–14.9)
GFR SERPL CREATININE-BSD FRML MDRD: >60 ML/MIN/1.73M2
GLUCOSE SERPL-MCNC: 92 MG/DL (ref 70–99)
HCT VFR BLD AUTO: 39.4 % (ref 36–46)
HGB BLD-MCNC: 12.8 G/DL (ref 12–16)
LYMPHOCYTES NFR BLD: 1.5 K/UL (ref 1–4.8)
LYMPHOCYTES RELATIVE PERCENT: 32 % (ref 24–44)
MCH RBC QN AUTO: 28.1 PG (ref 26–34)
MCHC RBC AUTO-ENTMCNC: 32.5 G/DL (ref 31–37)
MCV RBC AUTO: 86.7 FL (ref 80–100)
MONOCYTES NFR BLD: 0.3 K/UL (ref 0.1–1.3)
MONOCYTES NFR BLD: 7 % (ref 1–7)
NEUTROPHILS NFR BLD: 59 % (ref 36–66)
NEUTS SEG NFR BLD: 2.8 K/UL (ref 1.3–9.1)
PLATELET # BLD AUTO: 256 K/UL (ref 150–450)
PMV BLD AUTO: 7.6 FL (ref 6–12)
POTASSIUM SERPL-SCNC: 4.1 MMOL/L (ref 3.7–5.3)
RBC # BLD AUTO: 4.55 M/UL (ref 4–5.2)
SODIUM SERPL-SCNC: 141 MMOL/L (ref 135–144)
WBC OTHER # BLD: 4.7 K/UL (ref 3.5–11)

## 2024-03-08 PROCEDURE — 6370000000 HC RX 637 (ALT 250 FOR IP): Performed by: INTERNAL MEDICINE

## 2024-03-08 PROCEDURE — 6360000002 HC RX W HCPCS

## 2024-03-08 PROCEDURE — 80048 BASIC METABOLIC PNL TOTAL CA: CPT

## 2024-03-08 PROCEDURE — 97161 PT EVAL LOW COMPLEX 20 MIN: CPT

## 2024-03-08 PROCEDURE — 97165 OT EVAL LOW COMPLEX 30 MIN: CPT

## 2024-03-08 PROCEDURE — 36415 COLL VENOUS BLD VENIPUNCTURE: CPT

## 2024-03-08 PROCEDURE — 2580000003 HC RX 258

## 2024-03-08 PROCEDURE — 99239 HOSP IP/OBS DSCHRG MGMT >30: CPT | Performed by: INTERNAL MEDICINE

## 2024-03-08 PROCEDURE — 99221 1ST HOSP IP/OBS SF/LOW 40: CPT | Performed by: PSYCHIATRY & NEUROLOGY

## 2024-03-08 PROCEDURE — 85025 COMPLETE CBC W/AUTO DIFF WBC: CPT

## 2024-03-08 RX ORDER — ASPIRIN 81 MG/1
81 TABLET ORAL DAILY
Status: DISCONTINUED | OUTPATIENT
Start: 2024-03-08 | End: 2024-03-08 | Stop reason: HOSPADM

## 2024-03-08 RX ORDER — ASPIRIN 81 MG/1
81 TABLET ORAL DAILY
Qty: 30 TABLET | Refills: 3 | Status: SHIPPED | OUTPATIENT
Start: 2024-03-08

## 2024-03-08 RX ADMIN — Medication 10 ML: at 09:16

## 2024-03-08 RX ADMIN — ENOXAPARIN SODIUM 40 MG: 100 INJECTION SUBCUTANEOUS at 09:15

## 2024-03-08 RX ADMIN — ASPIRIN 81 MG: 81 TABLET, COATED ORAL at 18:04

## 2024-03-08 ASSESSMENT — ENCOUNTER SYMPTOMS
SINUS PRESSURE: 0
SHORTNESS OF BREATH: 0
SINUS PAIN: 0
ABDOMINAL PAIN: 0
COUGH: 0
VOMITING: 0
NAUSEA: 1
BACK PAIN: 0

## 2024-03-08 NOTE — PROGRESS NOTES
been on her medications.  She was tachycardic according to her and was concerned that it was the atrial fibrillation that was causing her symptoms.  She has sudden onset dizziness and describes it as if the room is spinning.  She says that her equilibrium is completely off balance and she feels like she is going to fall over anytime she moves.  Movement exacerbates her symptoms.  She also admits to nausea and vomiting prompting her visit.  Response To Previous Treatment: Not applicable  Family / Caregiver Present: No  Referring Practitioner: Dr. JAN Hernandez  Referral Date : 03/07/24  Diagnosis: dizziness, vertigo, A-Fib  Follows Commands: Within Functional Limits  Other (Comment): OK per nurse Tiff to proceed w/ PT evaluation  General Comment  Comments: CTA of the haed and neck shows  IMPRESSION:  1. No flow limiting stenosis or dissection of the cervical carotid/vertebral  arteries.  2. No significant stenosis or large vessel occlusion of the eyuwwe-th-Tegwhq.  3. There is a 1-2 mm outpouching arising from the right supraclinoid ICA,  which may represent an infundibulum versus a tiny aneurysm.  CT scan of the head shows  IMPRESSION:  No acute intracranial process identified.   MRI of the brain is ordered.  Subjective  Subjective: pt reports that she had a sudden onset of dizziness yesterday which she attributes to her \"A-Fib acting up.\"         Social/Functional History  Social/Functional History  Lives With: Significant other  Type of Home: House  Home Layout: Two level, Laundry in basement, Bed/Bath upstairs (bathroom also in the basement)  Home Access: Stairs to enter with rails  Entrance Stairs - Number of Steps: 3 MEHUL w/ 2 rails; 14 steps w/ 2 rails to 2nd floor; 9 steps w/ 2 rails to basement laundry and bathroom  Entrance Stairs - Rails: Both  Bathroom Shower/Tub: Tub/Shower unit, Curtain  Bathroom Toilet: Standard (has 2 walls nearby)  Bathroom Equipment: Hand-held shower  Bathroom Accessibility: Walker  200' x 1  Comments: no LOB noted  Stairs/Curb  Stairs?: Yes  Stairs  # Steps : 3  Stairs Height: 6\"  Rails: Right ascending  Device: No Device  Assistance: Independent  Comment: no LOB noted on steps     Balance  Sitting - Static: Good  Sitting - Dynamic: Good  Standing - Static: Good (no device)  Standing - Dynamic: Good (no device)                                                         AM-PAC - Mobility    AM-PAC Basic Mobility - Inpatient   How much help is needed turning from your back to your side while in a flat bed without using bedrails?: None  How much help is needed moving from lying on your back to sitting on the side of a flat bed without using bedrails?: None  How much help is needed moving to and from a bed to a chair?: None  How much help is needed standing up from a chair using your arms?: None  How much help is needed walking in hospital room?: None  How much help is needed climbing 3-5 steps with a railing?: None  AM-PAC Inpatient Mobility Raw Score : 24  AM-PAC Inpatient T-Scale Score : 61.14  Mobility Inpatient CMS 0-100% Score: 0  Mobility Inpatient CMS G-Code Modifier : CH            Goals  Short Term Goals  Time Frame for Short Term Goals: D/C PT- pt is up independently in the room  Patient Goals   Patient Goals : return home w/ SO       Education  Patient Education  Education Given To: Patient  Education Provided: Role of Therapy;Plan of Care  Education Method: Demonstration;Verbal  Barriers to Learning: None  Education Outcome: Verbalized understanding;Demonstrated understanding      Therapy Time   Individual Concurrent Group Co-treatment   Time In 0902         Time Out 0917         Minutes 15                 Laurie Krueger, PT

## 2024-03-08 NOTE — CONSULTS
[facility] Neurology   IN-PATIENT SERVICE      NEUROLOGY CONSULT  NOTE            Date:   3/8/2024  Patient name:  Татьяна Liu  Date of admission:  3/7/2024  YOB: 1970      Chief Complaint:     Chief Complaint   Patient presents with    Dizziness    Nausea    Emesis       Reason for Consult:      Dizziness    History of Present Illness:     The patient is a 53 y.o. female who presents with Dizziness, Nausea, and Emesis  . The patient was seen and examined and the chart was reviewed.  Patient states she was at home yesterday sitting down on the couch when she suddenly felt very dizzy, as well as heart palpitations.  She has known history of atrial fibrillation since 2021 and states she can usually feel when she goes in and out of A-fib.  She states the dizziness happened pretty much at the same time that she felt heart palpitations.  She had also associated nausea and vomiting.  The dizziness persisted so she came to the ED.  In the past her [id number] score has been 1 so she was recommended only a baby aspirin.  Eventually she lost health insurance and stopped taking all medications.  She was never recommended to be on full anticoagulation.  Denies any prior history of strokes.    In the ED she underwent CT of the head without acute intracranial abnormalities.  CTA head and neck no LVO or significant stenosis, there is incidental finding of 1-2 mm right supraclinoid ICA infundibulum.  Patient was given meclizine as well as Benadryl saline and Zofran.  There was concern for peripheral vertigo.    This morning she states she came out of A-fib and her dizziness has totally resolved.  At this time sitting up in bed eating a meal.  No current complaints.  She has been up and ambulating without any issues.  Worked with PT who states she is independent with therapies.  She believes her symptoms likely are related to A-fib.    Past Medical History:     Atrial fibrillation    Past Surgical History:     Past

## 2024-03-08 NOTE — PROGRESS NOTES
[facility]   Occupational Therapy Evaluation  Date: 3/8/24  Patient Name: Татьяна Lui       Room: 5-01  MRN: [id number]  Account: [id number]   : 1970  (53 y.o.) Gender: female     Discharge Recommendations:  The patient's needs are being met with no further Occupational Therapy recommended at discharge.          Referring Practitioner: Buddy Hernandez MD  Diagnosis: Dizziness           Past Medical History:  has no past medical history on file.    Past Surgical History:   has a past surgical history that includes  section and Bunionectomy.    Restrictions  Restrictions/Precautions  Restrictions/Precautions: Fall Risk, Up as Tolerated (peripheral IV right antecubital)  Required Braces or Orthoses?: No  Implants present? :  (denies)      Vitals  Vitals  O2 Device: None (Room air)     Subjective  Subjective: \"My mother gave me the reacher beacuse she said it made her look to old.\" Pt was agreeable to OT/PT eval  Comments: Ok per RN Tiff for OT/PT eval  Subjective  Pain: denies      Social/Functional History  Social/Functional History  Lives With: Significant other  Type of Home: House  Home Layout: Two level, Laundry in basement, Bed/Bath upstairs (bathroom also in the basement)  Home Access: Stairs to enter with rails  Entrance Stairs - Number of Steps: 3 MEHUL w/ 2 rails; 14 steps w/ 2 rails to 2nd floor; 9 steps w/ 2 rails to basement laundry and bathroom  Entrance Stairs - Rails: Both  Bathroom Shower/Tub: Tub/Shower unit, Curtain  Bathroom Toilet: Standard (has 2 walls nearby)  Bathroom Equipment: Hand-held shower  Bathroom Accessibility: Walker accessible  Home Equipment: Reacher  ADL Assistance: Independent  Homemaking Assistance: Independent  Ambulation Assistance: Independent (no device)  Transfer Assistance: Independent  Active : Yes  Mode of Transportation: SUV  Occupation: Full time employment  Type of Occupation: works from home  IADL Comments: sleeps  in bed w/ pillow support  Additional Comments: SO works but is able to assist if needed, states she has good support system      Objective  Orientation  Overall Orientation Status: Within Functional Limits  Orientation Level: Oriented to place, Oriented to time, Oriented to situation, Oriented to person  Cognition  Overall Cognitive Status: WFL   Sensation  Overall Sensation Status: Impaired (N/T in Toes on L foot)    ADL  Feeding: Independent  Grooming: Independent  UE Bathing: Independent  LE Bathing: Independent  UE Dressing: Independent  LE Dressing: Independent  LE Dressing Skilled Clinical Factors: Pt able to peter B socks while sitting EOB  Toileting: Independent  Additional Comments: ADL scores based on clinical reasoning and skilled observation unless otherwise noted.       UE Function  LUE AROM (degrees)  LUE AROM : WFL  Left Hand AROM (degrees)  Left Hand AROM: WFL  Tone LUE  LUE Tone: Normotonic  LUE Strength  Gross LUE Strength: WFL  L Hand General: 4+/5    RUE AROM (degrees)  RUE AROM : WFL  Right Hand AROM (degrees)  Right Hand AROM: WFL  Tone RUE  RUE Tone: Normotonic  RUE Strength  Gross RUE Strength: WFL  R Hand General: 4+/5       Fine Motor Skills/Coordination  Coordination  Movements Are Fluid And Coordinated: Yes              Bed Mobility  Bed mobility  Rolling to Right: Independent  Supine to Sit: Independent  Scooting: Independent  Bed Mobility Comments: Bed mobility completed with HOB flat. Pt denies dizziness with change in position.    Balance  Balance  Sitting Balance: Independent  Standing Balance: Independent       Transfers  Transfers  Sit to stand: Independent  Stand to sit: Independent    Functional Mobility  Functional - Mobility Device: No device  Activity: Other (Hallway)  Assist Level: Independent  Functional Mobility Comments: No LOB noted    Assessment  Assessment  Assessment: Pt independent with self care and mobility. Pt denies concerns. No further OT needed. Please re-consult

## 2024-03-08 NOTE — CARE COORDINATION
ONGOING DISCHARGE PLAN:    Patient is alert and oriented x4.    Spoke with patient regarding discharge plan and patient confirms that plan is still to return to home w/ [facility].     Pt. Denies VNS.    Following for Med Assist, for Pt. Has No Insurance.     Pt. Has New PCP w/ Apt already scheduled.     Per Neuro Notes,  Pt. Is to F/U w/ Neuroendovascular Surgery,When she obtains Insurance, to monitor her Right Supraclinoid ICA Aneurysm. F/U Information is placed on AVS.    Pt. Denies other needs at this time.    Will continue to follow for additional discharge needs.    If patient is discharged prior to next notation, then this note serves as note for discharge by case management.    Electronically signed by Татьяна Payne RN on 3/8/2024 at 1:20 PM

## 2024-03-08 NOTE — PLAN OF CARE
Problem: Discharge Planning  Goal: Discharge to home or other facility with appropriate resources  Outcome: Progressing  Flowsheets  Taken 3/7/2024 2000 by Anna Valdez RN  Discharge to home or other facility with appropriate resources:   Identify barriers to discharge with patient and caregiver   Arrange for needed discharge resources and transportation as appropriate   Identify discharge learning needs (meds, wound care, etc)  Taken 3/7/2024 1424 by Mihaela Vaca RN  Discharge to home or other facility with appropriate resources:   Identify barriers to discharge with patient and caregiver   Identify discharge learning needs (meds, wound care, etc)   Arrange for interpreters to assist at discharge as needed     Problem: Safety - Adult  Goal: Free from fall injury  Outcome: Progressing

## 2024-03-08 NOTE — PROGRESS NOTES
[facility]    PROGRESS NOTE             3/8/2024    7:04 AM    Name:   Татьяна Lui  MRN:     [id number]     Acct:      [id number]   Room:   2115/2115-01   Day:  1  Admit Date:  3/7/2024  9:31 AM    PCP:  No primary care provider on file.  Code Status:  Full Code    Subjective:     C/C:   Chief Complaint   Patient presents with    Dizziness    Nausea    Emesis     Interval History Status: improved.    Patient seen and examined this morning at bedside.  No acute events overnight.      Currently in normal sinus rhythm. Patient states that she still has chest flutters and some chest pain.  However is improved from admission.      States that her dizziness has improved, however occurs with head turn to the left at night.  No further nausea or vomiting.  Awaiting neurology and cardiology consults.  MRI today.      Brief History:     Patient is a 53-year-old female with past medical history of atrial fibrillation, not on any current medications.  Patient presented with acute onset dizziness with associated nausea and vomiting.  Symptoms began earlier this morning while patient was sitting in a chair talking to her sister.  States she feels like the room is spinning around her.  Also states that she cannot balance because of the spinning sensation.  Has continued nausea, vomited twice from this.  Has associated ringing in her ears, left worse than right.  Symptoms are worse with movement, better at rest however constant.  Patient states that she has a history of atrial fibrillation and when she was diagnosed with this she had similar symptoms.  Does also endorse some chest palpitations, racing heartbeat and shortness of breath.  She does not take any medications because she could not afford them. Denies any recent upper respiratory symptoms.      In the ED, patient had CT head and CTA which were negative for any acute abnormalities. PE  BRAIN/VENTRICLES: There is no acute intracranial hemorrhage, mass effect or midline shift.  No abnormal extra-axial fluid collection.  The gray-white differentiation is maintained without evidence of an acute infarct.  There is no evidence of hydrocephalus. Calcified 6.3 mm left meningioma extending off the inner table of the left frontal parietal bone is unchanged from the prior exam. ORBITS: The visualized portion of the orbits demonstrate no acute abnormality. SINUSES: Minimal mucosal thickening involving the ethmoid air cells. Remainder of the imaged paranasal sinuses and mastoid air cells are well aerated. SOFT TISSUES/SKULL:  No acute abnormality of the visualized skull or soft tissues.     No acute intracranial process identified.     XR CHEST PORTABLE    Result Date: 3/7/2024  EXAMINATION: ONE XRAY VIEW OF THE CHEST 3/7/2024 10:11 am COMPARISON: None. HISTORY: ORDERING SYSTEM PROVIDED HISTORY: n/v, dizziness TECHNOLOGIST PROVIDED HISTORY: n/v, dizziness Reason for Exam: n/v, dizziness; chest pain FINDINGS: Cardiac silhouette is within normal limits. Pulmonary vasculature is within normal limits. The lungs are clear. No pneumothorax is identified. Bony structures are unremarkable.An azygous fissure is noted.     No acute cardiopulmonary process.         Physical Examination:        Physical Exam  Constitutional:       General: She is not in acute distress.  HENT:      Mouth/Throat:      Mouth: Mucous membranes are moist.   Eyes:      Extraocular Movements:      Right eye: No nystagmus.      Left eye: No nystagmus.   Cardiovascular:      Rate and Rhythm: Normal rate and regular rhythm.      Pulses: Normal pulses.      Heart sounds: No murmur heard.  Pulmonary:      Effort: Pulmonary effort is normal. No respiratory distress.   Abdominal:      General: Bowel sounds are normal. There is no distension.      Palpations: Abdomen is soft.   Musculoskeletal:      Right lower leg: No edema.      Left lower leg: No

## 2024-03-08 NOTE — DISCHARGE INSTRUCTIONS
Please take metoprolol half tablet twice daily. Please take a baby aspirin daily. Please follow up with your PCP, Dr. Carter.

## 2024-03-08 NOTE — CONSULTS
[facility]    Consult                        Today's Date: 3/8/2024  Patient Name: Татьяна Lui  Date of admission: 3/7/2024  9:31 AM  Patient's age: 53 y.o., 1970  Admission Dx: Dizziness [R42]  Noncompliance with medication regimen [Z91.148]  Uncontrolled atrial fibrillation (HCC) [I48.91]  Benign paroxysmal positional vertigo, unspecified laterality [H81.10]    Reason for Consult:  Cardiac evaluation    Requesting Physician: Hernan Thomas MD    CHIEF COMPLAINT:  Dizziness, nausea and vomiting    History Obtained From:  patient, electronic medical record    HISTORY OF PRESENT ILLNESS:      The patient is a 53 y.o.  female who is admitted to the hospital for dizziness, nausea and vomiting. She was sitting on a cough and felt sudden dizziness and palpitations.  This was followed by nausea and vomiting. In ER she was noted to be in atrial fibrillation. She has history of afib in the past.     Past Medical History:   has no past medical history on file.    Past Surgical History:   has a past surgical history that includes  section and Bunionectomy.     Home Medications:    Prior to Admission medications    Medication Sig Start Date End Date Taking? Authorizing Provider   meclizine (ANTIVERT) 12.5 MG tablet Take 1 tablet by mouth 3 times daily as needed   Yes Provider, MD Mayco   atorvastatin (LIPITOR) 40 MG tablet Take 1 tablet by mouth nightly 21   Adria Sahu MD   metoprolol tartrate (LOPRESSOR) 25 MG tablet Take 0.5 tablets by mouth 2 times daily 21   Adria Sahu MD       Allergies:  Patient has no known allergies.    Social History:   reports that she has never smoked. She has never used smokeless tobacco. She reports current alcohol use. She reports current drug use. Drug: Marijuana (Weed).     Family History: family history includes Cancer in her maternal aunt; Diabetes in her father; Heart Disease in her father; Seizures in her mother. No  Labs     03/07/24  1000 03/08/24  0528    141   K 3.9 4.1   CO2 24 26   BUN 13 13   CREATININE 0.6 0.7   LABGLOM >60 >60   GLUCOSE 149* 92     BNP: No results for input(s): \"BNP\" in the last 72 hours.  PT/INR: No results for input(s): \"PROTIME\", \"INR\" in the last 72 hours.  APTT:No results for input(s): \"APTT\" in the last 72 hours.  CARDIAC ENZYMES:No results for input(s): \"CKTOTAL\", \"CKMB\", \"CKMBINDEX\", \"TROPONINI\" in the last 72 hours.  FASTING LIPID PANEL:  Lab Results   Component Value Date/Time    HDL 72 08/19/2021 05:13 AM    TRIG 82 08/19/2021 05:13 AM     LIVER PROFILE:  Recent Labs     03/07/24  1000   AST 31   ALT 22   LABALBU 4.7       IMPRESSION:    Patient Active Problem List   Diagnosis    Perimenopause    Right flank pain    Musculoskeletal strain    Family history of diabetes mellitus    Chronic right-sided low back pain without sciatica    Thoracic spine pain    Repetitive use syndrome    New onset atrial fibrillation (HCC)    Dizziness    Hypokalemia    Chest pain     PAF  Dizziness    RECOMMENDATIONS:  ASA 81mg po qday  Metoprolol 12.5mg po BID  OP echo  Ok to discharge from cardiology perspective      Discussed with patient and Nurse.    Jim Jackson MD, MD [facility]           [phone]

## 2024-03-09 LAB
EKG ATRIAL RATE: 67 BPM
EKG P AXIS: 96 DEGREES
EKG P-R INTERVAL: 208 MS
EKG Q-T INTERVAL: 410 MS
EKG QRS DURATION: 80 MS
EKG QTC CALCULATION (BAZETT): 433 MS
EKG R AXIS: 49 DEGREES
EKG T AXIS: 48 DEGREES
EKG VENTRICULAR RATE: 67 BPM

## 2024-03-09 NOTE — DISCHARGE SUMMARY
[facility]   IN-PATIENT SERVICE   [facility]    Discharge Summary     Patient ID: Татьяна Lui  :  1970   MRN: [id number]     ACCOUNT:  [id number]   Patient's PCP: No primary care provider on file.  Admit Date: 3/7/2024   Discharge Date: 3/9/2024     Length of Stay: 1  Code Status:  Prior  Admitting Physician: Hernan Thomas MD  Discharge Physician: Buddy Hernandez MD     Active Discharge Diagnoses:       Primary Problem  Dizziness      Hospital Problems  Active Hospital Problems    Diagnosis Date Noted    Benign paroxysmal positional vertigo [H81.10] 2024     Priority: High    Dizziness [R42] 2021    PAF (paroxysmal atrial fibrillation) ([facility]) [I48.0] 2021       Admission Condition:  fair     Discharged Condition: good    Hospital Stay:       Hospital Course:      Татьяна is a 53-year-old female with past medical history of proximal A-fib admitted for the management of vertigo and atrial fibrillation.  Patient presented to the ER with complaints of dizziness, nausea and vomiting.  Patient also endorses that she had fluttering and palpitations in her chest.  Denies any shortness of breath.  In the ED patient was found to be in A-fib.  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  During admission patient spontaneously converted to normal sinus rhythm.  Dizziness sensation improved when out of afib. [id number] calculated at 1. Recommended patient start metoprolol 12.5 mg twice daily and 81 mg aspirin.    Significant therapeutic interventions: metoprolol    Significant Diagnostic Studies:   Labs / Micro:  CBC:   Lab Results   Component Value Date/Time    WBC 4.7 2024 05:28 AM    RBC 4.55 2024 05:28 AM    HGB 12.8 2024 05:28 AM    HCT 39.4 2024 05:28 AM    MCV 86.7

## 2024-04-01 ENCOUNTER — OFFICE VISIT (OUTPATIENT)
Dept: INTERNAL MEDICINE CLINIC | Age: 54
End: 2024-04-01
Payer: COMMERCIAL

## 2024-04-01 ENCOUNTER — TELEPHONE (OUTPATIENT)
Dept: INTERNAL MEDICINE CLINIC | Age: 54
End: 2024-04-01

## 2024-04-01 VITALS
BODY MASS INDEX: 36.25 KG/M2 | OXYGEN SATURATION: 98 % | SYSTOLIC BLOOD PRESSURE: 118 MMHG | DIASTOLIC BLOOD PRESSURE: 74 MMHG | HEART RATE: 88 BPM | WEIGHT: 185.6 LBS

## 2024-04-01 DIAGNOSIS — Z13.220 SCREENING FOR HYPERLIPIDEMIA: ICD-10-CM

## 2024-04-01 DIAGNOSIS — I48.0 PAF (PAROXYSMAL ATRIAL FIBRILLATION) (HCC): ICD-10-CM

## 2024-04-01 DIAGNOSIS — L30.9 ECZEMA, UNSPECIFIED TYPE: ICD-10-CM

## 2024-04-01 DIAGNOSIS — R42 VERTIGO: Primary | ICD-10-CM

## 2024-04-01 DIAGNOSIS — Z12.11 SCREEN FOR COLON CANCER: ICD-10-CM

## 2024-04-01 DIAGNOSIS — Z12.31 ENCOUNTER FOR SCREENING MAMMOGRAM FOR BREAST CANCER: ICD-10-CM

## 2024-04-01 DIAGNOSIS — Z12.31 ENCOUNTER FOR SCREENING MAMMOGRAM FOR MALIGNANT NEOPLASM OF BREAST: ICD-10-CM

## 2024-04-01 PROCEDURE — 99204 OFFICE O/P NEW MOD 45 MIN: CPT | Performed by: INTERNAL MEDICINE

## 2024-04-01 RX ORDER — CLOBETASOL PROPIONATE 0.5 MG/G
OINTMENT TOPICAL
Qty: 1 EACH | Refills: 2 | Status: SHIPPED | OUTPATIENT
Start: 2024-04-01

## 2024-04-01 RX ORDER — MECLIZINE HCL 12.5 MG/1
12.5 TABLET ORAL 3 TIMES DAILY PRN
Qty: 30 TABLET | Refills: 0 | Status: SHIPPED | OUTPATIENT
Start: 2024-04-01 | End: 2024-04-11

## 2024-04-01 SDOH — ECONOMIC STABILITY: HOUSING INSECURITY
IN THE LAST 12 MONTHS, WAS THERE A TIME WHEN YOU DID NOT HAVE A STEADY PLACE TO SLEEP OR SLEPT IN A SHELTER (INCLUDING NOW)?: NO

## 2024-04-01 SDOH — ECONOMIC STABILITY: INCOME INSECURITY: HOW HARD IS IT FOR YOU TO PAY FOR THE VERY BASICS LIKE FOOD, HOUSING, MEDICAL CARE, AND HEATING?: NOT HARD AT ALL

## 2024-04-01 SDOH — HEALTH STABILITY: PHYSICAL HEALTH: ON AVERAGE, HOW MANY DAYS PER WEEK DO YOU ENGAGE IN MODERATE TO STRENUOUS EXERCISE (LIKE A BRISK WALK)?: 5 DAYS

## 2024-04-01 SDOH — ECONOMIC STABILITY: FOOD INSECURITY: WITHIN THE PAST 12 MONTHS, YOU WORRIED THAT YOUR FOOD WOULD RUN OUT BEFORE YOU GOT MONEY TO BUY MORE.: NEVER TRUE

## 2024-04-01 SDOH — HEALTH STABILITY: PHYSICAL HEALTH: ON AVERAGE, HOW MANY MINUTES DO YOU ENGAGE IN EXERCISE AT THIS LEVEL?: 30 MIN

## 2024-04-01 SDOH — ECONOMIC STABILITY: FOOD INSECURITY: WITHIN THE PAST 12 MONTHS, THE FOOD YOU BOUGHT JUST DIDN'T LAST AND YOU DIDN'T HAVE MONEY TO GET MORE.: NEVER TRUE

## 2024-04-01 ASSESSMENT — PATIENT HEALTH QUESTIONNAIRE - PHQ9
SUM OF ALL RESPONSES TO PHQ QUESTIONS 1-9: 0
SUM OF ALL RESPONSES TO PHQ QUESTIONS 1-9: 0
1. LITTLE INTEREST OR PLEASURE IN DOING THINGS: NOT AT ALL
2. FEELING DOWN, DEPRESSED OR HOPELESS: NOT AT ALL
SUM OF ALL RESPONSES TO PHQ QUESTIONS 1-9: 0
SUM OF ALL RESPONSES TO PHQ9 QUESTIONS 1 & 2: 0
SUM OF ALL RESPONSES TO PHQ QUESTIONS 1-9: 0

## 2024-04-01 NOTE — TELEPHONE ENCOUNTER
Called to inform patient PA for temovate ointment was denied and she stated she does not have insurance right now and will pay out of pocket for the medication.

## 2024-04-01 NOTE — PROGRESS NOTES
Visit Information    Have you changed or started any medications since your last visit including any over-the-counter medicines, vitamins, or herbal medicines? no   Are you having any side effects from any of your medications? -  no  Have you stopped taking any of your medications? Is so, why? -  no    Have you seen any other physician or provider since your last visit? No  Have you had any other diagnostic tests since your last visit? No  Have you been seen in the emergency room and/or had an admission to a hospital since we last saw you? No  Have you had your routine dental cleaning in the past 6 months? no    Have you activated your [employer] account? If not, what are your barriers? Yes     Patient Care Team:  Ebony Carter MD as PCP - General (Internal Medicine)    Medical History Review  Past Medical, Family, and Social History reviewed and does not contribute to the patient presenting condition    Health Maintenance   Topic Date Due    Hepatitis B vaccine (1 of 3 - 3-dose series) Never done    Depression Screen  Never done    HIV screen  Never done    Hepatitis C screen  Never done    Cervical cancer screen  Never done    Colorectal Cancer Screen  Never done    Breast cancer screen  Never done    Shingles vaccine (1 of 2) Never done    Lipids  08/19/2022    COVID-19 Vaccine (4 - 2023-24 season) 09/01/2023    Flu vaccine (Season Ended) 08/01/2024    DTaP/Tdap/Td vaccine (2 - Td or Tdap) 04/09/2028    Hepatitis A vaccine  Aged Out    Hib vaccine  Aged Out    Polio vaccine  Aged Out    Meningococcal (ACWY) vaccine  Aged Out    Pneumococcal 0-64 years Vaccine  Aged Out      
conjunctiva clear  Ears - hearing appears to be intact  Nose - no drainage noted  Mouth - mucous membranes moist  Neck - supple, no carotid bruits, thyroid not palpable  Chest - clear to auscultation, normal effort  Heart - normal rate, regular rhythm, no murmurs  Abdomen - soft, nontender, nondistended, bowel sounds present all four quadrants, no masses, hepatomegaly or splenomegaly  Neurological - normal speech, no focal findings or movement disorder noted, cranial nerves II through XII grossly intact  Extremities - peripheral pulses palpable, no pedal edema or calf pain with palpation  Skin - no gross lesions, rashes, or induration noted      Data:    Lab Results   Component Value Date/Time     03/08/2024 05:28 AM    K 4.1 03/08/2024 05:28 AM     03/08/2024 05:28 AM    CO2 26 03/08/2024 05:28 AM    BUN 13 03/08/2024 05:28 AM    CREATININE 0.7 03/08/2024 05:28 AM    GLUCOSE 92 03/08/2024 05:28 AM    PROT 7.8 03/07/2024 10:00 AM    LABALBU 4.7 03/07/2024 10:00 AM    BILITOT 0.4 03/07/2024 10:00 AM    ALKPHOS 88 03/07/2024 10:00 AM    AST 31 03/07/2024 10:00 AM    ALT 22 03/07/2024 10:00 AM     Lab Results   Component Value Date/Time    WBC 4.7 03/08/2024 05:28 AM    RBC 4.55 03/08/2024 05:28 AM    HGB 12.8 03/08/2024 05:28 AM    HCT 39.4 03/08/2024 05:28 AM    MCV 86.7 03/08/2024 05:28 AM    MCH 28.1 03/08/2024 05:28 AM    MCHC 32.5 03/08/2024 05:28 AM    RDW 13.1 03/08/2024 05:28 AM     03/08/2024 05:28 AM    MPV 7.6 03/08/2024 05:28 AM     Lab Results   Component Value Date/Time    TSH 1.02 03/07/2024 11:39 AM     Lab Results   Component Value Date/Time    CHOL 186 08/19/2021 05:13 AM    HDL 72 08/19/2021 05:13 AM          Assessment & Plan:     Diagnosis Orders   1. Vertigo  meclizine (ANTIVERT) 12.5 MG tablet    [facility]      2. PAF (paroxysmal atrial fibrillation) (HCC)        3. Encounter for screening mammogram for breast cancer  [facility] Digital Screen Bilateral

## 2024-07-15 ENCOUNTER — TELEPHONE (OUTPATIENT)
Dept: INTERNAL MEDICINE CLINIC | Age: 54
End: 2024-07-15